# Patient Record
Sex: MALE | Race: OTHER | NOT HISPANIC OR LATINO
[De-identification: names, ages, dates, MRNs, and addresses within clinical notes are randomized per-mention and may not be internally consistent; named-entity substitution may affect disease eponyms.]

---

## 2017-08-14 ENCOUNTER — TRANSCRIPTION ENCOUNTER (OUTPATIENT)
Age: 27
End: 2017-08-14

## 2017-08-14 ENCOUNTER — APPOINTMENT (OUTPATIENT)
Dept: INTERNAL MEDICINE | Facility: CLINIC | Age: 27
End: 2017-08-14

## 2017-08-17 ENCOUNTER — TRANSCRIPTION ENCOUNTER (OUTPATIENT)
Age: 27
End: 2017-08-17

## 2017-10-01 ENCOUNTER — OUTPATIENT (OUTPATIENT)
Dept: OUTPATIENT SERVICES | Facility: HOSPITAL | Age: 27
LOS: 1 days | End: 2017-10-01
Payer: MEDICAID

## 2017-10-01 PROCEDURE — G9001: CPT

## 2017-10-05 ENCOUNTER — INPATIENT (INPATIENT)
Facility: HOSPITAL | Age: 27
LOS: 6 days | Discharge: HOME | End: 2017-10-12
Attending: PSYCHIATRY & NEUROLOGY | Admitting: PSYCHIATRY & NEUROLOGY
Payer: MEDICAID

## 2017-10-05 VITALS
TEMPERATURE: 99 F | DIASTOLIC BLOOD PRESSURE: 110 MMHG | HEART RATE: 118 BPM | RESPIRATION RATE: 16 BRPM | SYSTOLIC BLOOD PRESSURE: 156 MMHG

## 2017-10-05 RX ORDER — AZITHROMYCIN 500 MG/1
1000 TABLET, FILM COATED ORAL ONCE
Qty: 0 | Refills: 0 | Status: COMPLETED | OUTPATIENT
Start: 2017-10-05 | End: 2017-10-05

## 2017-10-05 RX ORDER — KETOROLAC TROMETHAMINE 30 MG/ML
15 SYRINGE (ML) INJECTION ONCE
Qty: 0 | Refills: 0 | Status: DISCONTINUED | OUTPATIENT
Start: 2017-10-05 | End: 2017-10-05

## 2017-10-05 RX ORDER — CEFTRIAXONE 500 MG/1
250 INJECTION, POWDER, FOR SOLUTION INTRAMUSCULAR; INTRAVENOUS ONCE
Qty: 0 | Refills: 0 | Status: COMPLETED | OUTPATIENT
Start: 2017-10-05 | End: 2017-10-05

## 2017-10-05 RX ADMIN — Medication 15 MILLIGRAM(S): at 23:45
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
## 2017-10-06 DIAGNOSIS — F31.9 BIPOLAR DISORDER, UNSPECIFIED: ICD-10-CM

## 2017-10-06 LAB
ALBUMIN SERPL ELPH-MCNC: 4.5 G/DL — SIGNIFICANT CHANGE UP (ref 3.3–5)
ALP SERPL-CCNC: 61 U/L — SIGNIFICANT CHANGE UP (ref 40–120)
ALT FLD-CCNC: 18 U/L — SIGNIFICANT CHANGE UP (ref 4–41)
AMPHET UR-MCNC: NEGATIVE — SIGNIFICANT CHANGE UP
APAP SERPL-MCNC: < 15 UG/ML — LOW (ref 15–25)
AST SERPL-CCNC: 43 U/L — HIGH (ref 4–40)
BARBITURATES MEASUREMENT: NEGATIVE — SIGNIFICANT CHANGE UP
BARBITURATES UR SCN-MCNC: NEGATIVE — SIGNIFICANT CHANGE UP
BASOPHILS # BLD AUTO: 0.04 K/UL — SIGNIFICANT CHANGE UP (ref 0–0.2)
BASOPHILS NFR BLD AUTO: 0.4 % — SIGNIFICANT CHANGE UP (ref 0–2)
BENZODIAZ SERPL-MCNC: NEGATIVE — SIGNIFICANT CHANGE UP
BENZODIAZ UR-MCNC: NEGATIVE — SIGNIFICANT CHANGE UP
BILIRUB SERPL-MCNC: 0.2 MG/DL — SIGNIFICANT CHANGE UP (ref 0.2–1.2)
BUN SERPL-MCNC: 13 MG/DL — SIGNIFICANT CHANGE UP (ref 7–23)
CALCIUM SERPL-MCNC: 9.6 MG/DL — SIGNIFICANT CHANGE UP (ref 8.4–10.5)
CANNABINOIDS UR-MCNC: POSITIVE — SIGNIFICANT CHANGE UP
CHLORIDE SERPL-SCNC: 102 MMOL/L — SIGNIFICANT CHANGE UP (ref 98–107)
CO2 SERPL-SCNC: 22 MMOL/L — SIGNIFICANT CHANGE UP (ref 22–31)
COCAINE METAB.OTHER UR-MCNC: NEGATIVE — SIGNIFICANT CHANGE UP
CREAT SERPL-MCNC: 1.03 MG/DL — SIGNIFICANT CHANGE UP (ref 0.5–1.3)
EOSINOPHIL # BLD AUTO: 0.13 K/UL — SIGNIFICANT CHANGE UP (ref 0–0.5)
EOSINOPHIL NFR BLD AUTO: 1.2 % — SIGNIFICANT CHANGE UP (ref 0–6)
ETHANOL BLD-MCNC: < 10 MG/DL — SIGNIFICANT CHANGE UP
GLUCOSE SERPL-MCNC: 76 MG/DL — SIGNIFICANT CHANGE UP (ref 70–99)
HCT VFR BLD CALC: 47.4 % — SIGNIFICANT CHANGE UP (ref 39–50)
HGB BLD-MCNC: 15.8 G/DL — SIGNIFICANT CHANGE UP (ref 13–17)
HIV1 AG SER QL: SIGNIFICANT CHANGE UP
HIV1+2 AB SPEC QL: SIGNIFICANT CHANGE UP
IMM GRANULOCYTES # BLD AUTO: 0.04 # — SIGNIFICANT CHANGE UP
IMM GRANULOCYTES NFR BLD AUTO: 0.4 % — SIGNIFICANT CHANGE UP (ref 0–1.5)
LYMPHOCYTES # BLD AUTO: 27.8 % — SIGNIFICANT CHANGE UP (ref 13–44)
LYMPHOCYTES # BLD AUTO: 3.1 K/UL — SIGNIFICANT CHANGE UP (ref 1–3.3)
MCHC RBC-ENTMCNC: 30.1 PG — SIGNIFICANT CHANGE UP (ref 27–34)
MCHC RBC-ENTMCNC: 33.3 % — SIGNIFICANT CHANGE UP (ref 32–36)
MCV RBC AUTO: 90.3 FL — SIGNIFICANT CHANGE UP (ref 80–100)
METHADONE UR-MCNC: NEGATIVE — SIGNIFICANT CHANGE UP
MONOCYTES # BLD AUTO: 1.01 K/UL — HIGH (ref 0–0.9)
MONOCYTES NFR BLD AUTO: 9 % — SIGNIFICANT CHANGE UP (ref 2–14)
NEUTROPHILS # BLD AUTO: 6.85 K/UL — SIGNIFICANT CHANGE UP (ref 1.8–7.4)
NEUTROPHILS NFR BLD AUTO: 61.2 % — SIGNIFICANT CHANGE UP (ref 43–77)
NRBC # FLD: 0 — SIGNIFICANT CHANGE UP
OPIATES UR-MCNC: NEGATIVE — SIGNIFICANT CHANGE UP
OXYCODONE UR-MCNC: NEGATIVE — SIGNIFICANT CHANGE UP
PCP UR-MCNC: NEGATIVE — SIGNIFICANT CHANGE UP
PLATELET # BLD AUTO: 232 K/UL — SIGNIFICANT CHANGE UP (ref 150–400)
PMV BLD: 9.7 FL — SIGNIFICANT CHANGE UP (ref 7–13)
POTASSIUM SERPL-MCNC: 3.8 MMOL/L — SIGNIFICANT CHANGE UP (ref 3.5–5.3)
POTASSIUM SERPL-SCNC: 3.8 MMOL/L — SIGNIFICANT CHANGE UP (ref 3.5–5.3)
PROT SERPL-MCNC: 7.1 G/DL — SIGNIFICANT CHANGE UP (ref 6–8.3)
RBC # BLD: 5.25 M/UL — SIGNIFICANT CHANGE UP (ref 4.2–5.8)
RBC # FLD: 13.5 % — SIGNIFICANT CHANGE UP (ref 10.3–14.5)
SALICYLATES SERPL-MCNC: < 5 MG/DL — LOW (ref 15–30)
SODIUM SERPL-SCNC: 141 MMOL/L — SIGNIFICANT CHANGE UP (ref 135–145)
TSH SERPL-MCNC: 2.73 UIU/ML — SIGNIFICANT CHANGE UP (ref 0.27–4.2)
WBC # BLD: 11.17 K/UL — HIGH (ref 3.8–10.5)
WBC # FLD AUTO: 11.17 K/UL — HIGH (ref 3.8–10.5)

## 2017-10-06 PROCEDURE — 99285 EMERGENCY DEPT VISIT HI MDM: CPT

## 2017-10-06 PROCEDURE — 99223 1ST HOSP IP/OBS HIGH 75: CPT

## 2017-10-06 RX ORDER — ACETAMINOPHEN 500 MG
650 TABLET ORAL EVERY 6 HOURS
Qty: 0 | Refills: 0 | Status: DISCONTINUED | OUTPATIENT
Start: 2017-10-06 | End: 2017-10-12

## 2017-10-06 RX ORDER — DIPHENHYDRAMINE HCL 50 MG
25 CAPSULE ORAL ONCE
Qty: 0 | Refills: 0 | Status: DISCONTINUED | OUTPATIENT
Start: 2017-10-06 | End: 2017-10-12

## 2017-10-06 RX ORDER — HALOPERIDOL DECANOATE 100 MG/ML
5 INJECTION INTRAMUSCULAR ONCE
Qty: 0 | Refills: 0 | Status: DISCONTINUED | OUTPATIENT
Start: 2017-10-06 | End: 2017-10-12

## 2017-10-06 RX ORDER — DIPHENHYDRAMINE HCL 50 MG
25 CAPSULE ORAL EVERY 6 HOURS
Qty: 0 | Refills: 0 | Status: DISCONTINUED | OUTPATIENT
Start: 2017-10-06 | End: 2017-10-12

## 2017-10-06 RX ORDER — ACETAMINOPHEN 500 MG
650 TABLET ORAL ONCE
Qty: 0 | Refills: 0 | Status: COMPLETED | OUTPATIENT
Start: 2017-10-06 | End: 2017-10-06

## 2017-10-06 RX ORDER — OXCARBAZEPINE 300 MG/1
600 TABLET, FILM COATED ORAL
Qty: 0 | Refills: 0 | Status: DISCONTINUED | OUTPATIENT
Start: 2017-10-06 | End: 2017-10-06

## 2017-10-06 RX ORDER — ARIPIPRAZOLE 15 MG/1
25 TABLET ORAL DAILY
Qty: 0 | Refills: 0 | Status: DISCONTINUED | OUTPATIENT
Start: 2017-10-06 | End: 2017-10-06

## 2017-10-06 RX ORDER — LIDOCAINE 4 G/100G
1 CREAM TOPICAL DAILY
Qty: 0 | Refills: 0 | Status: COMPLETED | OUTPATIENT
Start: 2017-10-06 | End: 2017-10-09

## 2017-10-06 RX ORDER — HALOPERIDOL DECANOATE 100 MG/ML
5 INJECTION INTRAMUSCULAR EVERY 6 HOURS
Qty: 0 | Refills: 0 | Status: DISCONTINUED | OUTPATIENT
Start: 2017-10-06 | End: 2017-10-12

## 2017-10-06 RX ORDER — ARIPIPRAZOLE 15 MG/1
25 TABLET ORAL DAILY
Qty: 0 | Refills: 0 | Status: DISCONTINUED | OUTPATIENT
Start: 2017-10-06 | End: 2017-10-12

## 2017-10-06 RX ORDER — ALBUTEROL 90 UG/1
2 AEROSOL, METERED ORAL EVERY 6 HOURS
Qty: 0 | Refills: 0 | Status: DISCONTINUED | OUTPATIENT
Start: 2017-10-06 | End: 2017-10-06

## 2017-10-06 RX ORDER — ALBUTEROL 90 UG/1
2 AEROSOL, METERED ORAL EVERY 6 HOURS
Qty: 0 | Refills: 0 | Status: DISCONTINUED | OUTPATIENT
Start: 2017-10-06 | End: 2017-10-12

## 2017-10-06 RX ORDER — OXCARBAZEPINE 300 MG/1
600 TABLET, FILM COATED ORAL
Qty: 0 | Refills: 0 | Status: DISCONTINUED | OUTPATIENT
Start: 2017-10-06 | End: 2017-10-12

## 2017-10-06 RX ADMIN — Medication 650 MILLIGRAM(S): at 13:35

## 2017-10-06 RX ADMIN — LIDOCAINE 1 PATCH: 4 CREAM TOPICAL at 14:42

## 2017-10-06 RX ADMIN — OXCARBAZEPINE 600 MILLIGRAM(S): 300 TABLET, FILM COATED ORAL at 08:18

## 2017-10-06 RX ADMIN — ARIPIPRAZOLE 25 MILLIGRAM(S): 15 TABLET ORAL at 14:42

## 2017-10-06 RX ADMIN — HALOPERIDOL DECANOATE 5 MILLIGRAM(S): 100 INJECTION INTRAMUSCULAR at 21:53

## 2017-10-06 RX ADMIN — Medication 2 MILLIGRAM(S): at 04:26

## 2017-10-06 RX ADMIN — Medication 2 MILLIGRAM(S): at 21:38

## 2017-10-06 RX ADMIN — Medication 25 MILLIGRAM(S): at 21:38

## 2017-10-06 RX ADMIN — Medication 650 MILLIGRAM(S): at 06:03

## 2017-10-06 RX ADMIN — AZITHROMYCIN 1000 MILLIGRAM(S): 500 TABLET, FILM COATED ORAL at 00:14

## 2017-10-06 RX ADMIN — Medication 650 MILLIGRAM(S): at 17:13

## 2017-10-06 RX ADMIN — OXCARBAZEPINE 600 MILLIGRAM(S): 300 TABLET, FILM COATED ORAL at 20:11

## 2017-10-06 RX ADMIN — CEFTRIAXONE 250 MILLIGRAM(S): 500 INJECTION, POWDER, FOR SOLUTION INTRAMUSCULAR; INTRAVENOUS at 00:14

## 2017-10-06 NOTE — ED BEHAVIORAL HEALTH ASSESSMENT NOTE - PAST PSYCHOTROPIC MEDICATION
Per chart review, as patient was disorganized and unable to give information: Lamictal - rash Latuda- more suicidal, Zyprexa- severe sedation, Seroquel - weight gain sedation Lithium- nausea/vomiting, Depakote- rash ?  Celexa- unclear why stopped, Wellbutrin-ineffective

## 2017-10-06 NOTE — ED ADULT NURSE REASSESSMENT NOTE - NS ED NURSE REASSESS COMMENT FT1
Pt remains awake, intrusive with pressured speech. Pt restless, pacing unit. Dr. Murphy made aware, pt ordered to receive STAT Ativan 2mg IM. Report given to Jeff PANIAGUA. Will continue to monitor for safety and therapeutic effect for safe tx to [facility].

## 2017-10-06 NOTE — ED BEHAVIORAL HEALTH ASSESSMENT NOTE - RISK ASSESSMENT
Chronic risk factors- young age, male gender, single, non-caretaker, history of mental illness resulting  in multiple inpatient admissions, history of medication noncompliance. Acute risk factors- acute nancy,  impaired insight/judgment, medication non-compliance, recent psychiatric hospitalization, substance abuse. Protective factors- domiciled, in supportive environment, in outpatient treatment, denies suicidal and homicidal ideations, intent and plan. Patient’s current risk factors place patient at imminent risk to self and requires inpatient hospitalization for safety.

## 2017-10-06 NOTE — ED ADULT NURSE REASSESSMENT NOTE - GENERAL PATIENT STATE
pt arrives from medical intake, awake a&ox3, restless, hyperverbal and intrusive with staff and patients.

## 2017-10-06 NOTE — ED BEHAVIORAL HEALTH ASSESSMENT NOTE - DESCRIPTION
irritable, labile. crying, tangential    Vital Signs Last 24 Hrs  T(C): 37.1 (05 Oct 2017 21:18), Max: 37.1 (05 Oct 2017 21:18)  T(F): 98.7 (05 Oct 2017 21:18), Max: 98.7 (05 Oct 2017 21:18)  HR: 82 (06 Oct 2017 02:11) (82 - 118)  BP: 156/110 (05 Oct 2017 21:18) (156/110 - 156/110)  BP(mean): --  RR: 16 (05 Oct 2017 21:18) (16 - 16)  SpO2: -- None See HPI

## 2017-10-06 NOTE — ED BEHAVIORAL HEALTH ASSESSMENT NOTE - PSYCHIATRIC ISSUES AND PLAN (INCLUDE STANDING AND PRN MEDICATION)
As pt has been non-compliant with meds resulting in repeated inpt hospitalizations, will hold Zoloft. Continue Trileptal 600 mg PO BID and Abilify 25mg for mood stabilization/nancy

## 2017-10-06 NOTE — ED BEHAVIORAL HEALTH ASSESSMENT NOTE - PRIMARY DX
Deferred diagnosis on axis II Bipolar disorder, current episode manic without psychotic features, moderate

## 2017-10-06 NOTE — ED BEHAVIORAL HEALTH ASSESSMENT NOTE - SUBSTANCE ISSUES AND PLAN (INCLUDE STANDING AND PRN MEDICATION)
Patient with history of marijuana use, reporting recent use. However, CIWA/CINA not currently indicated.

## 2017-10-06 NOTE — ED ADULT NURSE REASSESSMENT NOTE - NS ED NURSE REASSESS COMMENT FT1
+ effect from Ativan 2mg IM received. Pt resting in stretcher, NAD, VSS. Cleared for [facility] admission. Awaiting security for tx to low 3 as ordered by Katherine JANG.

## 2017-10-06 NOTE — ED BEHAVIORAL HEALTH ASSESSMENT NOTE - OTHER PAST PSYCHIATRIC HISTORY (INCLUDE DETAILS REGARDING ONSET, COURSE OF ILLNESS, INPATIENT/OUTPATIENT TREATMENT)
History of Bipolar I disorder with psychotic features, multiple inpatient admissions, in current outpatient follow up wit Dr. Lynch at [facility] and previously in [facility] hospital .    History of ECT treatments.

## 2017-10-06 NOTE — ED BEHAVIORAL HEALTH ASSESSMENT NOTE - SUICIDE RISK FACTORS
Mood episode/Highly impulsive behavior/Substance abuse/dependence/Agitation/severe anxiety/Perceived burden on family and others/Global insomnia

## 2017-10-06 NOTE — ED BEHAVIORAL HEALTH ASSESSMENT NOTE - SUMMARY
Harsh Evans is a  27 year old [place] man, single with no dependents, living with parents, former college student at [employer], reports that he is currently employed as a , , past psychiatric history of bipolar I disorder with psychotic features, history of multiple inpatient psychiatric admissions, most recently at [facility] from 4/4-4/15/16, 4/20-4/28/16, [facility] 5/2/16-5/16/16, and [facility] 5/25/16-6/3/16, previously in partial, denies history of suicide attempts, no history of violence/arrests, history of cannabis and cocaine use, no reported history of alcohol dependence/DTs/complicated withdrawals, currently in treatment with Dr Lynch, who presents with manic symptoms, lability, pacing, and tangential thoughts. This is his 4th admission in the past month for PI, nancy, delusions in the context of MJ use and likely med non-compliance.

## 2017-10-06 NOTE — ED BEHAVIORAL HEALTH ASSESSMENT NOTE - DESCRIPTION (FIRST USE, LAST USE, QUANTITY, FREQUENCY, DURATION)
states he went out today for drinks but did not finish his drink and states that he just decided to drive himself to the hospital Reports history of marijuana use, parents report use in past 2 days +Utox in the past

## 2017-10-06 NOTE — ED BEHAVIORAL HEALTH ASSESSMENT NOTE - HPI (INCLUDE ILLNESS QUALITY, SEVERITY, DURATION, TIMING, CONTEXT, MODIFYING FACTORS, ASSOCIATED SIGNS AND SYMPTOMS)
Harsh Evans is a  27 year old Mosotho man, single with no dependents, living with parents, former college student at [employer], reports that he is currently employed as a , , past psychiatric history of bipolar I disorder with psychotic features, history of multiple inpatient psychiatric admissions, most recently at [facility] from 4/4-4/15/16, 4/20-4/28/16, [facility] 5/2/16-5/16/16, and [facility] 5/25/16-6/3/16, previously in partial, denies history of suicide attempts, no history of violence/arrests, history of cannabis and cocaine use, no reported history of alcohol dependence/DTs/complicated withdrawals, currently in treatment with Dr Lynch, who presents with manic symptoms, lability, pacing, and tangential thoughts.    Patient is poor historian.  States that he presented to the hospital for leg pain.  States that he has been feeling manic since he was started on Zoloft.  Patient reports that he saw Dr Lynch today and then starts laughing.  States that he wanted to increase his Zoloft.  He then begins talking about the phone call he received from his brother in [place] who tell him that the patient is the [facility]'s [facility].  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  He reports that his brother then called his mother.  Patient reports that he has 6 diagnoses, bipolar, schizoaffective, polysubstance and continued to run off diagnoses.  When asked about polysubstance states that he stopped smoking THC and when confronter that his urine was positive asked what was the quantity and then proceeded to state that the test was wrong but states that he did smoke 2 days ago.  He reports that he also used to use "blow."  States that he was upset that Dr Lynch wanted to increase the Zoloft (last note in CVM from 9/7/17) and states that he has not been taking it and has not felt this good and then began to cry.  Patient reports that he has not slept. He continues pacing the hallway despite reported leg pain. Harsh Evans is a  27 year old Kuwaiti man, single with no dependents, living with parents, former college student at [employer], reports that he is currently employed as a , , past psychiatric history of bipolar I disorder with psychotic features, history of multiple inpatient psychiatric admissions, most recently at [facility] from 4/4-4/15/16, 4/20-4/28/16, [facility] 5/2/16-5/16/16, and [facility] 5/25/16-6/3/16, previously in partial, denies history of suicide attempts, no history of violence/arrests, history of cannabis and cocaine use, no reported history of alcohol dependence/DTs/complicated withdrawals, currently in treatment with Dr Lynch, who presents with manic symptoms, lability, pacing, acting bizarre, paranoid and with tangential thoughts.    Patient is poor historian.  States that he presented to the hospital for leg pain.  States that he has been feeling manic since he was started on Zoloft.  Patient reports that he saw Dr Lynch today and then starts laughing.  States that he wanted to increase his Zoloft.  He then begins talking about the phone call he received from his brother in [place] who tell him that the patient is the brother's [facility].  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  He reports that his brother then called his mother.  Patient reports that he has 6 diagnoses, bipolar, schizoaffective, polysubstance and continued to run off diagnoses.  When asked about polysubstance states that he stopped smoking THC and when confronter that his urine was positive asked what was the quantity and then proceeded to state that the test was wrong but states that he did smoke 2 days ago.  He reports that he also used to use "blow."  States that he was upset that Dr Lynch wanted to increase the Zoloft (last note in CVM from 9/7/17) and states that he has not been taking it and has not felt this good and then began to cry.  Patient reports that he has not slept. He continues pacing the hallway despite reported leg pain.

## 2017-10-06 NOTE — ED PROVIDER NOTE - OBJECTIVE STATEMENT
28 y/o male hx bipolar c/o chronic right leg pain worsening over past 3 weeks with some lower back pain.  States feels like nerve pain he has had in past, taking tylenol with mild relief. Also requesting psych as feels getting more manic (and per father is). no si/hi/ah. no trauma, no urinary or fecal symptoms, neuro symptoms, denies other symptoms. also requesting std testing.

## 2017-10-06 NOTE — ED PROVIDER NOTE - MEDICAL DECISION MAKING DETAILS
chronic leg pain likely sciatica based on description, no red flags, no threat to self currently but stating feeling more up and with some psychomotor agitation. will do psych screening, g/c screening and empiric treatment, psych consult/bh.

## 2017-10-07 PROCEDURE — 99232 SBSQ HOSP IP/OBS MODERATE 35: CPT

## 2017-10-07 RX ORDER — NICOTINE POLACRILEX 2 MG
1 GUM BUCCAL ONCE
Qty: 0 | Refills: 0 | Status: COMPLETED | OUTPATIENT
Start: 2017-10-07 | End: 2017-10-07

## 2017-10-07 RX ORDER — IBUPROFEN 200 MG
600 TABLET ORAL EVERY 6 HOURS
Qty: 0 | Refills: 0 | Status: DISCONTINUED | OUTPATIENT
Start: 2017-10-07 | End: 2017-10-12

## 2017-10-07 RX ORDER — NICOTINE POLACRILEX 2 MG
1 GUM BUCCAL DAILY
Qty: 0 | Refills: 0 | Status: DISCONTINUED | OUTPATIENT
Start: 2017-10-07 | End: 2017-10-12

## 2017-10-07 RX ADMIN — Medication 2 MILLIGRAM(S): at 10:00

## 2017-10-07 RX ADMIN — OXCARBAZEPINE 600 MILLIGRAM(S): 300 TABLET, FILM COATED ORAL at 07:49

## 2017-10-07 RX ADMIN — Medication 30 MILLILITER(S): at 13:08

## 2017-10-07 RX ADMIN — Medication 600 MILLIGRAM(S): at 17:20

## 2017-10-07 RX ADMIN — ARIPIPRAZOLE 25 MILLIGRAM(S): 15 TABLET ORAL at 09:20

## 2017-10-07 RX ADMIN — LIDOCAINE 1 PATCH: 4 CREAM TOPICAL at 20:36

## 2017-10-07 RX ADMIN — ALBUTEROL 2 PUFF(S): 90 AEROSOL, METERED ORAL at 07:56

## 2017-10-07 RX ADMIN — Medication 600 MILLIGRAM(S): at 09:59

## 2017-10-07 RX ADMIN — OXCARBAZEPINE 600 MILLIGRAM(S): 300 TABLET, FILM COATED ORAL at 20:36

## 2017-10-07 RX ADMIN — LIDOCAINE 1 PATCH: 4 CREAM TOPICAL at 11:21

## 2017-10-07 RX ADMIN — Medication 1 EACH: at 18:00

## 2017-10-07 RX ADMIN — Medication 600 MILLIGRAM(S): at 16:40

## 2017-10-07 RX ADMIN — Medication 600 MILLIGRAM(S): at 18:08

## 2017-10-07 RX ADMIN — Medication 25 MILLIGRAM(S): at 14:10

## 2017-10-07 RX ADMIN — Medication 2 MILLIGRAM(S): at 20:36

## 2017-10-08 PROCEDURE — 99231 SBSQ HOSP IP/OBS SF/LOW 25: CPT

## 2017-10-08 RX ORDER — DOCUSATE SODIUM 100 MG
100 CAPSULE ORAL ONCE
Qty: 0 | Refills: 0 | Status: COMPLETED | OUTPATIENT
Start: 2017-10-08 | End: 2017-10-08

## 2017-10-08 RX ORDER — NICOTINE POLACRILEX 2 MG
1 GUM BUCCAL DAILY
Qty: 0 | Refills: 0 | Status: DISCONTINUED | OUTPATIENT
Start: 2017-10-08 | End: 2017-10-12

## 2017-10-08 RX ADMIN — Medication 600 MILLIGRAM(S): at 11:00

## 2017-10-08 RX ADMIN — Medication 600 MILLIGRAM(S): at 20:35

## 2017-10-08 RX ADMIN — OXCARBAZEPINE 600 MILLIGRAM(S): 300 TABLET, FILM COATED ORAL at 20:34

## 2017-10-08 RX ADMIN — Medication 100 MILLIGRAM(S): at 09:57

## 2017-10-08 RX ADMIN — OXCARBAZEPINE 600 MILLIGRAM(S): 300 TABLET, FILM COATED ORAL at 07:53

## 2017-10-08 RX ADMIN — Medication 2 MILLIGRAM(S): at 21:05

## 2017-10-08 RX ADMIN — Medication 1 PATCH: at 07:51

## 2017-10-08 RX ADMIN — LIDOCAINE 1 PATCH: 4 CREAM TOPICAL at 10:15

## 2017-10-08 RX ADMIN — ARIPIPRAZOLE 25 MILLIGRAM(S): 15 TABLET ORAL at 09:17

## 2017-10-08 RX ADMIN — ALBUTEROL 2 PUFF(S): 90 AEROSOL, METERED ORAL at 20:37

## 2017-10-08 RX ADMIN — Medication 600 MILLIGRAM(S): at 21:35

## 2017-10-08 RX ADMIN — Medication 600 MILLIGRAM(S): at 11:26

## 2017-10-09 PROCEDURE — 99232 SBSQ HOSP IP/OBS MODERATE 35: CPT

## 2017-10-09 RX ORDER — POLYETHYLENE GLYCOL 3350 17 G/17G
17 POWDER, FOR SOLUTION ORAL DAILY
Qty: 0 | Refills: 0 | Status: DISCONTINUED | OUTPATIENT
Start: 2017-10-09 | End: 2017-10-12

## 2017-10-09 RX ORDER — ARIPIPRAZOLE 15 MG/1
300 TABLET ORAL ONCE
Qty: 0 | Refills: 0 | Status: COMPLETED | OUTPATIENT
Start: 2017-10-09 | End: 2017-10-09

## 2017-10-09 RX ORDER — INFLUENZA VIRUS VACCINE 15; 15; 15; 15 UG/.5ML; UG/.5ML; UG/.5ML; UG/.5ML
0.5 SUSPENSION INTRAMUSCULAR ONCE
Qty: 0 | Refills: 0 | Status: DISCONTINUED | OUTPATIENT
Start: 2017-10-09 | End: 2017-10-09

## 2017-10-09 RX ADMIN — OXCARBAZEPINE 600 MILLIGRAM(S): 300 TABLET, FILM COATED ORAL at 08:53

## 2017-10-09 RX ADMIN — HALOPERIDOL DECANOATE 5 MILLIGRAM(S): 100 INJECTION INTRAMUSCULAR at 05:57

## 2017-10-09 RX ADMIN — Medication 600 MILLIGRAM(S): at 14:50

## 2017-10-09 RX ADMIN — ALBUTEROL 2 PUFF(S): 90 AEROSOL, METERED ORAL at 20:00

## 2017-10-09 RX ADMIN — Medication 600 MILLIGRAM(S): at 06:30

## 2017-10-09 RX ADMIN — Medication 1 PATCH: at 06:38

## 2017-10-09 RX ADMIN — ARIPIPRAZOLE 300 MILLIGRAM(S): 15 TABLET ORAL at 14:42

## 2017-10-09 RX ADMIN — Medication 650 MILLIGRAM(S): at 18:05

## 2017-10-09 RX ADMIN — Medication 1 EACH: at 21:00

## 2017-10-09 RX ADMIN — Medication 2 MILLIGRAM(S): at 20:12

## 2017-10-09 RX ADMIN — Medication 650 MILLIGRAM(S): at 09:02

## 2017-10-09 RX ADMIN — POLYETHYLENE GLYCOL 3350 17 GRAM(S): 17 POWDER, FOR SOLUTION ORAL at 18:05

## 2017-10-09 RX ADMIN — LIDOCAINE 1 PATCH: 4 CREAM TOPICAL at 05:56

## 2017-10-09 RX ADMIN — OXCARBAZEPINE 600 MILLIGRAM(S): 300 TABLET, FILM COATED ORAL at 20:12

## 2017-10-09 RX ADMIN — ARIPIPRAZOLE 25 MILLIGRAM(S): 15 TABLET ORAL at 08:53

## 2017-10-09 RX ADMIN — Medication 600 MILLIGRAM(S): at 05:55

## 2017-10-09 RX ADMIN — Medication 25 MILLIGRAM(S): at 05:57

## 2017-10-10 PROCEDURE — 90853 GROUP PSYCHOTHERAPY: CPT

## 2017-10-10 RX ORDER — LIDOCAINE 4 G/100G
1 CREAM TOPICAL DAILY
Qty: 0 | Refills: 0 | Status: DISCONTINUED | OUTPATIENT
Start: 2017-10-10 | End: 2017-10-12

## 2017-10-10 RX ADMIN — Medication 25 MILLIGRAM(S): at 21:00

## 2017-10-10 RX ADMIN — LIDOCAINE 1 PATCH: 4 CREAM TOPICAL at 10:45

## 2017-10-10 RX ADMIN — Medication 650 MILLIGRAM(S): at 07:35

## 2017-10-10 RX ADMIN — Medication 650 MILLIGRAM(S): at 20:23

## 2017-10-10 RX ADMIN — Medication 600 MILLIGRAM(S): at 18:15

## 2017-10-10 RX ADMIN — Medication 30 MILLILITER(S): at 20:42

## 2017-10-10 RX ADMIN — Medication 2 MILLIGRAM(S): at 14:38

## 2017-10-10 RX ADMIN — Medication 25 MILLIGRAM(S): at 15:00

## 2017-10-10 RX ADMIN — Medication 650 MILLIGRAM(S): at 21:23

## 2017-10-10 RX ADMIN — HALOPERIDOL DECANOATE 5 MILLIGRAM(S): 100 INJECTION INTRAMUSCULAR at 15:00

## 2017-10-10 RX ADMIN — Medication 600 MILLIGRAM(S): at 17:15

## 2017-10-10 RX ADMIN — OXCARBAZEPINE 600 MILLIGRAM(S): 300 TABLET, FILM COATED ORAL at 08:30

## 2017-10-10 RX ADMIN — Medication 650 MILLIGRAM(S): at 08:30

## 2017-10-10 RX ADMIN — Medication 1 PATCH: at 09:33

## 2017-10-10 RX ADMIN — LIDOCAINE 1 PATCH: 4 CREAM TOPICAL at 09:34

## 2017-10-10 RX ADMIN — ARIPIPRAZOLE 25 MILLIGRAM(S): 15 TABLET ORAL at 08:30

## 2017-10-10 RX ADMIN — OXCARBAZEPINE 600 MILLIGRAM(S): 300 TABLET, FILM COATED ORAL at 20:23

## 2017-10-11 LAB
HIV 1+2 AB+HIV1 P24 AG SERPL QL IA: SIGNIFICANT CHANGE UP
OXCARBAZEPINE SERPL-MCNC: 19 UG/ML — SIGNIFICANT CHANGE UP (ref 10–35)

## 2017-10-11 RX ORDER — PNEUMOCOCCAL 23-VAL P-SAC VAC 25MCG/0.5
0.5 VIAL (ML) INJECTION ONCE
Qty: 0 | Refills: 0 | Status: COMPLETED | OUTPATIENT
Start: 2017-10-11 | End: 2017-10-11

## 2017-10-11 RX ORDER — INFLUENZA VIRUS VACCINE 15; 15; 15; 15 UG/.5ML; UG/.5ML; UG/.5ML; UG/.5ML
0.5 SUSPENSION INTRAMUSCULAR ONCE
Qty: 0 | Refills: 0 | Status: COMPLETED | OUTPATIENT
Start: 2017-10-11 | End: 2017-10-11

## 2017-10-11 RX ADMIN — OXCARBAZEPINE 600 MILLIGRAM(S): 300 TABLET, FILM COATED ORAL at 21:17

## 2017-10-11 RX ADMIN — Medication 650 MILLIGRAM(S): at 08:21

## 2017-10-11 RX ADMIN — Medication 2 MILLIGRAM(S): at 11:42

## 2017-10-11 RX ADMIN — Medication 600 MILLIGRAM(S): at 11:42

## 2017-10-11 RX ADMIN — Medication 600 MILLIGRAM(S): at 04:42

## 2017-10-11 RX ADMIN — LIDOCAINE 1 PATCH: 4 CREAM TOPICAL at 16:25

## 2017-10-11 RX ADMIN — Medication 0.5 MILLILITER(S): at 06:15

## 2017-10-11 RX ADMIN — Medication 650 MILLIGRAM(S): at 16:25

## 2017-10-11 RX ADMIN — LIDOCAINE 1 PATCH: 4 CREAM TOPICAL at 08:21

## 2017-10-11 RX ADMIN — Medication 600 MILLIGRAM(S): at 17:48

## 2017-10-11 RX ADMIN — Medication 600 MILLIGRAM(S): at 16:25

## 2017-10-11 RX ADMIN — Medication 25 MILLIGRAM(S): at 16:40

## 2017-10-11 RX ADMIN — INFLUENZA VIRUS VACCINE 0.5 MILLILITER(S): 15; 15; 15; 15 SUSPENSION INTRAMUSCULAR at 16:39

## 2017-10-11 RX ADMIN — OXCARBAZEPINE 600 MILLIGRAM(S): 300 TABLET, FILM COATED ORAL at 08:20

## 2017-10-11 RX ADMIN — Medication 30 MILLILITER(S): at 05:45

## 2017-10-11 RX ADMIN — Medication 1 EACH: at 06:00

## 2017-10-11 RX ADMIN — ARIPIPRAZOLE 25 MILLIGRAM(S): 15 TABLET ORAL at 08:20

## 2017-10-11 RX ADMIN — Medication 600 MILLIGRAM(S): at 05:42

## 2017-10-12 VITALS — WEIGHT: 205.03 LBS | HEIGHT: 69 IN

## 2017-10-12 DIAGNOSIS — R69 ILLNESS, UNSPECIFIED: ICD-10-CM

## 2017-10-12 PROCEDURE — 99238 HOSP IP/OBS DSCHRG MGMT 30/<: CPT

## 2017-10-12 RX ORDER — OXCARBAZEPINE 300 MG/1
1 TABLET, FILM COATED ORAL
Qty: 60 | Refills: 0 | OUTPATIENT
Start: 2017-10-12 | End: 2017-11-11

## 2017-10-12 RX ORDER — LIDOCAINE 4 G/100G
1 CREAM TOPICAL
Qty: 4 | Refills: 0 | OUTPATIENT
Start: 2017-10-12 | End: 2017-10-16

## 2017-10-12 RX ORDER — ARIPIPRAZOLE 15 MG/1
20 TABLET ORAL
Qty: 600 | Refills: 0 | OUTPATIENT
Start: 2017-10-12 | End: 2017-11-11

## 2017-10-12 RX ADMIN — ARIPIPRAZOLE 25 MILLIGRAM(S): 15 TABLET ORAL at 10:11

## 2017-10-12 RX ADMIN — OXCARBAZEPINE 600 MILLIGRAM(S): 300 TABLET, FILM COATED ORAL at 10:11

## 2017-10-15 ENCOUNTER — EMERGENCY (EMERGENCY)
Facility: HOSPITAL | Age: 27
LOS: 1 days | Discharge: HOME | End: 2017-10-15
Attending: EMERGENCY MEDICINE | Admitting: EMERGENCY MEDICINE
Payer: MEDICAID

## 2017-10-15 VITALS
SYSTOLIC BLOOD PRESSURE: 135 MMHG | OXYGEN SATURATION: 98 % | RESPIRATION RATE: 17 BRPM | DIASTOLIC BLOOD PRESSURE: 79 MMHG | HEART RATE: 98 BPM | TEMPERATURE: 98 F

## 2017-10-15 VITALS
DIASTOLIC BLOOD PRESSURE: 70 MMHG | OXYGEN SATURATION: 100 % | RESPIRATION RATE: 16 BRPM | TEMPERATURE: 98 F | SYSTOLIC BLOOD PRESSURE: 150 MMHG | HEART RATE: 61 BPM

## 2017-10-15 DIAGNOSIS — F31.0 BIPOLAR DISORDER, CURRENT EPISODE HYPOMANIC: ICD-10-CM

## 2017-10-15 LAB
ALBUMIN SERPL ELPH-MCNC: 4.4 G/DL — SIGNIFICANT CHANGE UP (ref 3.3–5)
ALP SERPL-CCNC: 63 U/L — SIGNIFICANT CHANGE UP (ref 40–120)
ALT FLD-CCNC: 31 U/L — SIGNIFICANT CHANGE UP (ref 4–41)
AMPHET UR-MCNC: NEGATIVE — SIGNIFICANT CHANGE UP
APAP SERPL-MCNC: < 15 UG/ML — LOW (ref 15–25)
APPEARANCE UR: CLEAR — SIGNIFICANT CHANGE UP
AST SERPL-CCNC: 45 U/L — HIGH (ref 4–40)
BARBITURATES MEASUREMENT: NEGATIVE — SIGNIFICANT CHANGE UP
BARBITURATES UR SCN-MCNC: NEGATIVE — SIGNIFICANT CHANGE UP
BASOPHILS # BLD AUTO: 0.03 K/UL — SIGNIFICANT CHANGE UP (ref 0–0.2)
BASOPHILS NFR BLD AUTO: 0.3 % — SIGNIFICANT CHANGE UP (ref 0–2)
BENZODIAZ SERPL-MCNC: NEGATIVE — SIGNIFICANT CHANGE UP
BENZODIAZ UR-MCNC: NEGATIVE — SIGNIFICANT CHANGE UP
BILIRUB SERPL-MCNC: 0.3 MG/DL — SIGNIFICANT CHANGE UP (ref 0.2–1.2)
BILIRUB UR-MCNC: NEGATIVE — SIGNIFICANT CHANGE UP
BLOOD UR QL VISUAL: NEGATIVE — SIGNIFICANT CHANGE UP
BUN SERPL-MCNC: 14 MG/DL — SIGNIFICANT CHANGE UP (ref 7–23)
CALCIUM SERPL-MCNC: 9.7 MG/DL — SIGNIFICANT CHANGE UP (ref 8.4–10.5)
CANNABINOIDS UR-MCNC: POSITIVE — SIGNIFICANT CHANGE UP
CHLORIDE SERPL-SCNC: 97 MMOL/L — LOW (ref 98–107)
CO2 SERPL-SCNC: 22 MMOL/L — SIGNIFICANT CHANGE UP (ref 22–31)
COCAINE METAB.OTHER UR-MCNC: NEGATIVE — SIGNIFICANT CHANGE UP
COLOR SPEC: YELLOW — SIGNIFICANT CHANGE UP
CREAT SERPL-MCNC: 0.98 MG/DL — SIGNIFICANT CHANGE UP (ref 0.5–1.3)
EOSINOPHIL # BLD AUTO: 0.19 K/UL — SIGNIFICANT CHANGE UP (ref 0–0.5)
EOSINOPHIL NFR BLD AUTO: 2.1 % — SIGNIFICANT CHANGE UP (ref 0–6)
ETHANOL BLD-MCNC: < 10 MG/DL — SIGNIFICANT CHANGE UP
GLUCOSE SERPL-MCNC: 96 MG/DL — SIGNIFICANT CHANGE UP (ref 70–99)
GLUCOSE UR-MCNC: NEGATIVE — SIGNIFICANT CHANGE UP
HCT VFR BLD CALC: 46.8 % — SIGNIFICANT CHANGE UP (ref 39–50)
HGB BLD-MCNC: 15.6 G/DL — SIGNIFICANT CHANGE UP (ref 13–17)
IMM GRANULOCYTES # BLD AUTO: 0.06 # — SIGNIFICANT CHANGE UP
IMM GRANULOCYTES NFR BLD AUTO: 0.7 % — SIGNIFICANT CHANGE UP (ref 0–1.5)
KETONES UR-MCNC: NEGATIVE — SIGNIFICANT CHANGE UP
LEUKOCYTE ESTERASE UR-ACNC: NEGATIVE — SIGNIFICANT CHANGE UP
LYMPHOCYTES # BLD AUTO: 3.1 K/UL — SIGNIFICANT CHANGE UP (ref 1–3.3)
LYMPHOCYTES # BLD AUTO: 33.8 % — SIGNIFICANT CHANGE UP (ref 13–44)
MCHC RBC-ENTMCNC: 30.6 PG — SIGNIFICANT CHANGE UP (ref 27–34)
MCHC RBC-ENTMCNC: 33.3 % — SIGNIFICANT CHANGE UP (ref 32–36)
MCV RBC AUTO: 91.8 FL — SIGNIFICANT CHANGE UP (ref 80–100)
METHADONE UR-MCNC: NEGATIVE — SIGNIFICANT CHANGE UP
MONOCYTES # BLD AUTO: 0.99 K/UL — HIGH (ref 0–0.9)
MONOCYTES NFR BLD AUTO: 10.8 % — SIGNIFICANT CHANGE UP (ref 2–14)
MUCOUS THREADS # UR AUTO: SIGNIFICANT CHANGE UP
NEUTROPHILS # BLD AUTO: 4.8 K/UL — SIGNIFICANT CHANGE UP (ref 1.8–7.4)
NEUTROPHILS NFR BLD AUTO: 52.3 % — SIGNIFICANT CHANGE UP (ref 43–77)
NITRITE UR-MCNC: NEGATIVE — SIGNIFICANT CHANGE UP
NRBC # FLD: 0 — SIGNIFICANT CHANGE UP
OPIATES UR-MCNC: NEGATIVE — SIGNIFICANT CHANGE UP
OXYCODONE UR-MCNC: NEGATIVE — SIGNIFICANT CHANGE UP
PCP UR-MCNC: NEGATIVE — SIGNIFICANT CHANGE UP
PH UR: 6.5 — SIGNIFICANT CHANGE UP (ref 4.6–8)
PLATELET # BLD AUTO: 263 K/UL — SIGNIFICANT CHANGE UP (ref 150–400)
PMV BLD: 9.4 FL — SIGNIFICANT CHANGE UP (ref 7–13)
POTASSIUM SERPL-MCNC: 3.9 MMOL/L — SIGNIFICANT CHANGE UP (ref 3.5–5.3)
POTASSIUM SERPL-SCNC: 3.9 MMOL/L — SIGNIFICANT CHANGE UP (ref 3.5–5.3)
PROT SERPL-MCNC: 7.8 G/DL — SIGNIFICANT CHANGE UP (ref 6–8.3)
PROT UR-MCNC: 20 — SIGNIFICANT CHANGE UP
RBC # BLD: 5.1 M/UL — SIGNIFICANT CHANGE UP (ref 4.2–5.8)
RBC # FLD: 12.7 % — SIGNIFICANT CHANGE UP (ref 10.3–14.5)
RBC CASTS # UR COMP ASSIST: SIGNIFICANT CHANGE UP (ref 0–?)
SALICYLATES SERPL-MCNC: < 5 MG/DL — LOW (ref 15–30)
SODIUM SERPL-SCNC: 134 MMOL/L — LOW (ref 135–145)
SP GR SPEC: 1.02 — SIGNIFICANT CHANGE UP (ref 1–1.03)
TSH SERPL-MCNC: 2.28 UIU/ML — SIGNIFICANT CHANGE UP (ref 0.27–4.2)
UROBILINOGEN FLD QL: NORMAL E.U. — SIGNIFICANT CHANGE UP (ref 0.1–0.2)
WBC # BLD: 9.17 K/UL — SIGNIFICANT CHANGE UP (ref 3.8–10.5)
WBC # FLD AUTO: 9.17 K/UL — SIGNIFICANT CHANGE UP (ref 3.8–10.5)
WBC UR QL: SIGNIFICANT CHANGE UP (ref 0–?)

## 2017-10-15 PROCEDURE — 93010 ELECTROCARDIOGRAM REPORT: CPT

## 2017-10-15 PROCEDURE — 99285 EMERGENCY DEPT VISIT HI MDM: CPT | Mod: 25

## 2017-10-15 PROCEDURE — 90792 PSYCH DIAG EVAL W/MED SRVCS: CPT

## 2017-10-15 RX ORDER — BUDESONIDE AND FORMOTEROL FUMARATE DIHYDRATE 160; 4.5 UG/1; UG/1
2 AEROSOL RESPIRATORY (INHALATION) ONCE
Qty: 0 | Refills: 0 | Status: COMPLETED | OUTPATIENT
Start: 2017-10-15 | End: 2017-10-15

## 2017-10-15 RX ORDER — HALOPERIDOL DECANOATE 100 MG/ML
5 INJECTION INTRAMUSCULAR ONCE
Qty: 0 | Refills: 0 | Status: COMPLETED | OUTPATIENT
Start: 2017-10-15 | End: 2017-10-15

## 2017-10-15 RX ADMIN — HALOPERIDOL DECANOATE 5 MILLIGRAM(S): 100 INJECTION INTRAMUSCULAR at 09:51

## 2017-10-15 RX ADMIN — Medication 2 MILLIGRAM(S): at 09:51

## 2017-10-15 RX ADMIN — BUDESONIDE AND FORMOTEROL FUMARATE DIHYDRATE 2 PUFF(S): 160; 4.5 AEROSOL RESPIRATORY (INHALATION) at 10:21

## 2017-10-15 NOTE — ED BEHAVIORAL HEALTH ASSESSMENT NOTE - OTHER
substance use and leg pain fair somewhat increased rate, however not pressured at times irritable when discussing mom not believing he has pain substance
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
## 2017-10-15 NOTE — ED BEHAVIORAL HEALTH ASSESSMENT NOTE - SUICIDE PROTECTIVE FACTORS
Fear of death or dying due to pain/suffering/Positive therapeutic relationships/Supportive social network or family/Identifies reasons for living/Future oriented/Responsibility to family and others

## 2017-10-15 NOTE — ED ADULT NURSE NOTE - CHIEF COMPLAINT QUOTE
Pt walk in c/o Low back Pain with Numbness on Right leg Dx with Sciatica. Admitted Here at  last week. Pt looks uncomfortable. Per mother Pt looks like having Manic episode

## 2017-10-15 NOTE — ED BEHAVIORAL HEALTH ASSESSMENT NOTE - OTHER PAST PSYCHIATRIC HISTORY (INCLUDE DETAILS REGARDING ONSET, COURSE OF ILLNESS, INPATIENT/OUTPATIENT TREATMENT)
History of Bipolar I disorder with psychotic features, multiple inpt admissions, in [facility] .    History of ECT treatments with good effect

## 2017-10-15 NOTE — ED BEHAVIORAL HEALTH ASSESSMENT NOTE - DESCRIPTION (FIRST USE, LAST USE, QUANTITY, FREQUENCY, DURATION)
See HPI Reports history of marijuana use, pt reports he used once yesterday; UTOX positive for cannabis in the past; Utox negative for cocaine
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
## 2017-10-15 NOTE — ED PROVIDER NOTE - ATTENDING CONTRIBUTION TO CARE
Dr. Stapleton: I have personally performed a face to face bedside history and physical examination of this patient. I have discussed the history, examination, review of systems, assessment and plan of management with the resident. I have reviewed the electronic medical record and amended it to reflect my history, review of systems, physical exam, assessment and plan.    Attending Exam - Dr. Stapleton: GEN: well appearing, NAD  HEENT: +PERRL, EOMI  RESP: CTAB, no signs of resipiratory distress CV: s1s2 RRR ABD: soft/non tender/non distended  MSK: no deformities / swelling, normal range of motion, spine grossly normal NEURO: alert, non focal exam SKIN: normal color / temperature / condition. Dr. Stapleton: I have personally performed a face to face bedside history and physical examination of this patient. I have discussed the history, examination, review of systems, assessment and plan of management with the resident. I have reviewed the electronic medical record and amended it to reflect my history, review of systems, physical exam, assessment and plan.    Attending Exam - Dr. Stapleton: GEN: well appearing, NAD  HEENT: +PERRL, EOMI  RESP: CTAB, no signs of resipiratory distress CV: s1s2 RRR ABD: soft/non tender/non distended  MSK: no deformities / swelling, normal range of motion, spine grossly normal NEURO: alert, non focal exam SKIN: normal color / temperature / condition.    I endorsed this patient to the  NP after the patient was medically cleared by me, the NP placed the disposition of the patient.

## 2017-10-15 NOTE — ED BEHAVIORAL HEALTH ASSESSMENT NOTE - SAFETY PLAN DETAILS
Pt will notify his parents, call his provider, 911 or go to the nearest ER if SI/HI arise or if in crisis.

## 2017-10-15 NOTE — ED BEHAVIORAL HEALTH ASSESSMENT NOTE - DETAILS
See above [facility] 10/6/17-10/12/17 leg pain Mom was notified of d/c from hospital, she came to pick him up. Records reviewed on CVM

## 2017-10-15 NOTE — ED BEHAVIORAL HEALTH ASSESSMENT NOTE - SUICIDE RISK FACTORS
Substance abuse/dependence/Perceived burden on family and others/Mood episode/Agitation/severe anxiety

## 2017-10-15 NOTE — ED ADULT NURSE NOTE - OBJECTIVE STATEMENT
Rec'd in room 23. CC lower back pain radiating to right leg Rec'd in room 23. CC lower back pain radiating to right leg. Pt is manic. PMH Bipolar 1. Pt restless and agitated. Unable to get additional information from pt. Pt requested for mother to leave. Aox2. No acute distress.

## 2017-10-15 NOTE — ED ADULT TRIAGE NOTE - CHIEF COMPLAINT QUOTE
Pt walk in c/o Low back Pain with Numbness on Right leg Dx with Sciatica. Admitted Here at  last week. Pt walk in c/o Low back Pain with Numbness on Right leg Dx with Sciatica. Admitted Here at  last week. Pt looks uncomfortable. Per mother Pt looks like having Manic episode

## 2017-10-15 NOTE — ED BEHAVIORAL HEALTH ASSESSMENT NOTE - RISK ASSESSMENT
Chronic risk factors- young age, male gender, single, non-caretaker, history of mental illness resulting  in multiple inpatient admissions, history of medication noncompliance. Acute risk factors- hypomania,  limited insight/judgment, h/o medication non-compliance, recent psychiatric hospitalization, substance abuse. Protective factors- domiciled, in supportive environment, in outpatient treatment, denies SI/HI, intent and plan.

## 2017-10-15 NOTE — ED PROVIDER NOTE - MEDICAL DECISION MAKING DETAILS
26 yo man w/ increasing disorganization and nancy. Also w/ reports of chronic shoulder pain. Exam normal. Cleared for psychiatric eval. Will move to  for patient/staff safety. 26 yo man w/ increasing disorganization and nancy. Also w/ reports of chronic shoulder pain. Exam normal. Patient also requesting STI testing, will send HIV, syphilis screen, GC/chlamydia screen.  Cleared for psychiatric eval.  Will consult psych and dispo per psych
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
## 2017-10-15 NOTE — ED BEHAVIORAL HEALTH ASSESSMENT NOTE - DESCRIPTION
Initially agitated/disorganized, required voluntary Haldol and Ativan with good effect. During the interview he was calm and cooperative. None See HPI

## 2017-10-16 LAB
C TRACH RRNA SPEC QL NAA+PROBE: SIGNIFICANT CHANGE UP
N GONORRHOEA RRNA SPEC QL NAA+PROBE: SIGNIFICANT CHANGE UP
SPECIMEN SOURCE: SIGNIFICANT CHANGE UP
SPECIMEN SOURCE: SIGNIFICANT CHANGE UP
T PALLIDUM AB TITR SER: NEGATIVE — SIGNIFICANT CHANGE UP

## 2017-10-17 ENCOUNTER — INPATIENT (INPATIENT)
Facility: HOSPITAL | Age: 27
LOS: 14 days | Discharge: HOME | End: 2017-11-01
Attending: PSYCHIATRY & NEUROLOGY | Admitting: PSYCHIATRY & NEUROLOGY
Payer: MEDICAID

## 2017-10-17 VITALS
SYSTOLIC BLOOD PRESSURE: 146 MMHG | OXYGEN SATURATION: 99 % | HEART RATE: 65 BPM | TEMPERATURE: 98 F | RESPIRATION RATE: 16 BRPM | DIASTOLIC BLOOD PRESSURE: 85 MMHG

## 2017-10-17 DIAGNOSIS — F31.2 BIPOLAR DISORDER, CURRENT EPISODE MANIC SEVERE WITH PSYCHOTIC FEATURES: ICD-10-CM

## 2017-10-17 LAB
ALBUMIN SERPL ELPH-MCNC: 4.4 G/DL — SIGNIFICANT CHANGE UP (ref 3.3–5)
ALP SERPL-CCNC: 62 U/L — SIGNIFICANT CHANGE UP (ref 40–120)
ALT FLD-CCNC: 29 U/L — SIGNIFICANT CHANGE UP (ref 4–41)
AMPHET UR-MCNC: NEGATIVE — SIGNIFICANT CHANGE UP
APAP SERPL-MCNC: < 15 UG/ML — LOW (ref 15–25)
APPEARANCE UR: CLEAR — SIGNIFICANT CHANGE UP
AST SERPL-CCNC: 55 U/L — HIGH (ref 4–40)
BACTERIA # UR AUTO: SIGNIFICANT CHANGE UP
BACTERIA UR CULT: SIGNIFICANT CHANGE UP
BARBITURATES MEASUREMENT: NEGATIVE — SIGNIFICANT CHANGE UP
BARBITURATES UR SCN-MCNC: NEGATIVE — SIGNIFICANT CHANGE UP
BASOPHILS # BLD AUTO: 0.06 K/UL — SIGNIFICANT CHANGE UP (ref 0–0.2)
BASOPHILS NFR BLD AUTO: 0.5 % — SIGNIFICANT CHANGE UP (ref 0–2)
BASOPHILS NFR SPEC: 0 % — SIGNIFICANT CHANGE UP (ref 0–2)
BENZODIAZ SERPL-MCNC: NEGATIVE — SIGNIFICANT CHANGE UP
BENZODIAZ UR-MCNC: NEGATIVE — SIGNIFICANT CHANGE UP
BILIRUB SERPL-MCNC: 0.4 MG/DL — SIGNIFICANT CHANGE UP (ref 0.2–1.2)
BILIRUB UR-MCNC: NEGATIVE — SIGNIFICANT CHANGE UP
BLOOD UR QL VISUAL: NEGATIVE — SIGNIFICANT CHANGE UP
BUN SERPL-MCNC: 20 MG/DL — SIGNIFICANT CHANGE UP (ref 7–23)
CALCIUM SERPL-MCNC: 9.8 MG/DL — SIGNIFICANT CHANGE UP (ref 8.4–10.5)
CANNABINOIDS UR-MCNC: POSITIVE — SIGNIFICANT CHANGE UP
CHLORIDE SERPL-SCNC: 105 MMOL/L — SIGNIFICANT CHANGE UP (ref 98–107)
CO2 SERPL-SCNC: 22 MMOL/L — SIGNIFICANT CHANGE UP (ref 22–31)
COCAINE METAB.OTHER UR-MCNC: NEGATIVE — SIGNIFICANT CHANGE UP
COLOR SPEC: YELLOW — SIGNIFICANT CHANGE UP
CREAT SERPL-MCNC: 1.12 MG/DL — SIGNIFICANT CHANGE UP (ref 0.5–1.3)
EOSINOPHIL # BLD AUTO: 0.11 K/UL — SIGNIFICANT CHANGE UP (ref 0–0.5)
EOSINOPHIL NFR BLD AUTO: 0.9 % — SIGNIFICANT CHANGE UP (ref 0–6)
EOSINOPHIL NFR FLD: 0 % — SIGNIFICANT CHANGE UP (ref 0–6)
ETHANOL BLD-MCNC: < 10 MG/DL — SIGNIFICANT CHANGE UP
GLUCOSE SERPL-MCNC: 98 MG/DL — SIGNIFICANT CHANGE UP (ref 70–99)
GLUCOSE UR-MCNC: NEGATIVE — SIGNIFICANT CHANGE UP
HCT VFR BLD CALC: 48.9 % — SIGNIFICANT CHANGE UP (ref 39–50)
HGB BLD-MCNC: 16.1 G/DL — SIGNIFICANT CHANGE UP (ref 13–17)
HIV1 AG SER QL: SIGNIFICANT CHANGE UP
HIV1+2 AB SPEC QL: SIGNIFICANT CHANGE UP
IMM GRANULOCYTES # BLD AUTO: 0.05 # — SIGNIFICANT CHANGE UP
IMM GRANULOCYTES NFR BLD AUTO: 0.4 % — SIGNIFICANT CHANGE UP (ref 0–1.5)
KETONES UR-MCNC: SIGNIFICANT CHANGE UP
LEUKOCYTE ESTERASE UR-ACNC: NEGATIVE — SIGNIFICANT CHANGE UP
LYMPHOCYTES # BLD AUTO: 38.9 % — SIGNIFICANT CHANGE UP (ref 13–44)
LYMPHOCYTES # BLD AUTO: 4.89 K/UL — HIGH (ref 1–3.3)
LYMPHOCYTES NFR SPEC AUTO: 23.7 % — SIGNIFICANT CHANGE UP (ref 13–44)
MACROCYTES BLD QL: SLIGHT — SIGNIFICANT CHANGE UP
MCHC RBC-ENTMCNC: 30.3 PG — SIGNIFICANT CHANGE UP (ref 27–34)
MCHC RBC-ENTMCNC: 32.9 % — SIGNIFICANT CHANGE UP (ref 32–36)
MCV RBC AUTO: 92.1 FL — SIGNIFICANT CHANGE UP (ref 80–100)
METHADONE UR-MCNC: NEGATIVE — SIGNIFICANT CHANGE UP
MONOCYTES # BLD AUTO: 0.81 K/UL — SIGNIFICANT CHANGE UP (ref 0–0.9)
MONOCYTES NFR BLD AUTO: 6.4 % — SIGNIFICANT CHANGE UP (ref 2–14)
MONOCYTES NFR BLD: 6.1 % — SIGNIFICANT CHANGE UP (ref 2–9)
MUCOUS THREADS # UR AUTO: SIGNIFICANT CHANGE UP
NEUTROPHIL AB SER-ACNC: 62.3 % — SIGNIFICANT CHANGE UP (ref 43–77)
NEUTROPHILS # BLD AUTO: 6.65 K/UL — SIGNIFICANT CHANGE UP (ref 1.8–7.4)
NEUTROPHILS NFR BLD AUTO: 52.9 % — SIGNIFICANT CHANGE UP (ref 43–77)
NITRITE UR-MCNC: NEGATIVE — SIGNIFICANT CHANGE UP
NRBC # FLD: 0 — SIGNIFICANT CHANGE UP
OPIATES UR-MCNC: NEGATIVE — SIGNIFICANT CHANGE UP
OXYCODONE UR-MCNC: NEGATIVE — SIGNIFICANT CHANGE UP
PCP UR-MCNC: NEGATIVE — SIGNIFICANT CHANGE UP
PH UR: 6 — SIGNIFICANT CHANGE UP (ref 4.6–8)
PLATELET # BLD AUTO: 292 K/UL — SIGNIFICANT CHANGE UP (ref 150–400)
PLATELET COUNT - ESTIMATE: NORMAL — SIGNIFICANT CHANGE UP
PMV BLD: 8.9 FL — SIGNIFICANT CHANGE UP (ref 7–13)
POTASSIUM SERPL-MCNC: 4.5 MMOL/L — SIGNIFICANT CHANGE UP (ref 3.5–5.3)
POTASSIUM SERPL-SCNC: 4.5 MMOL/L — SIGNIFICANT CHANGE UP (ref 3.5–5.3)
PROT SERPL-MCNC: 7.8 G/DL — SIGNIFICANT CHANGE UP (ref 6–8.3)
PROT UR-MCNC: 100 — SIGNIFICANT CHANGE UP
RBC # BLD: 5.31 M/UL — SIGNIFICANT CHANGE UP (ref 4.2–5.8)
RBC # FLD: 12.7 % — SIGNIFICANT CHANGE UP (ref 10.3–14.5)
RBC CASTS # UR COMP ASSIST: SIGNIFICANT CHANGE UP (ref 0–?)
REVIEW TO FOLLOW: YES — SIGNIFICANT CHANGE UP
SALICYLATES SERPL-MCNC: < 5 MG/DL — LOW (ref 15–30)
SODIUM SERPL-SCNC: 144 MMOL/L — SIGNIFICANT CHANGE UP (ref 135–145)
SP GR SPEC: 1.04 — HIGH (ref 1–1.03)
SQUAMOUS # UR AUTO: SIGNIFICANT CHANGE UP
TSH SERPL-MCNC: 1.31 UIU/ML — SIGNIFICANT CHANGE UP (ref 0.27–4.2)
UROBILINOGEN FLD QL: 1 E.U. — SIGNIFICANT CHANGE UP (ref 0.1–0.2)
VARIANT LYMPHS # BLD: 7.9 % — SIGNIFICANT CHANGE UP
WBC # BLD: 12.57 K/UL — HIGH (ref 3.8–10.5)
WBC # FLD AUTO: 12.57 K/UL — HIGH (ref 3.8–10.5)
WBC UR QL: SIGNIFICANT CHANGE UP (ref 0–?)

## 2017-10-17 PROCEDURE — 99285 EMERGENCY DEPT VISIT HI MDM: CPT

## 2017-10-17 RX ORDER — HALOPERIDOL DECANOATE 100 MG/ML
5 INJECTION INTRAMUSCULAR EVERY 6 HOURS
Qty: 0 | Refills: 0 | Status: DISCONTINUED | OUTPATIENT
Start: 2017-10-17 | End: 2017-11-01

## 2017-10-17 RX ORDER — BUDESONIDE AND FORMOTEROL FUMARATE DIHYDRATE 160; 4.5 UG/1; UG/1
2 AEROSOL RESPIRATORY (INHALATION)
Qty: 0 | Refills: 0 | Status: DISCONTINUED | OUTPATIENT
Start: 2017-10-17 | End: 2017-11-01

## 2017-10-17 RX ORDER — OXCARBAZEPINE 300 MG/1
600 TABLET, FILM COATED ORAL
Qty: 0 | Refills: 0 | Status: DISCONTINUED | OUTPATIENT
Start: 2017-10-17 | End: 2017-11-01

## 2017-10-17 RX ORDER — ARIPIPRAZOLE 15 MG/1
20 TABLET ORAL DAILY
Qty: 0 | Refills: 0 | Status: DISCONTINUED | OUTPATIENT
Start: 2017-10-17 | End: 2017-10-18

## 2017-10-17 RX ORDER — NICOTINE POLACRILEX 2 MG
1 GUM BUCCAL THREE TIMES A DAY
Qty: 0 | Refills: 0 | Status: DISCONTINUED | OUTPATIENT
Start: 2017-10-17 | End: 2017-11-01

## 2017-10-17 RX ORDER — HALOPERIDOL DECANOATE 100 MG/ML
5 INJECTION INTRAMUSCULAR ONCE
Qty: 0 | Refills: 0 | Status: COMPLETED | OUTPATIENT
Start: 2017-10-17 | End: 2017-10-17

## 2017-10-17 RX ORDER — HALOPERIDOL DECANOATE 100 MG/ML
5 INJECTION INTRAMUSCULAR ONCE
Qty: 0 | Refills: 0 | Status: DISCONTINUED | OUTPATIENT
Start: 2017-10-17 | End: 2017-11-01

## 2017-10-17 RX ADMIN — Medication 2 MILLIGRAM(S): at 16:30

## 2017-10-17 RX ADMIN — HALOPERIDOL DECANOATE 5 MILLIGRAM(S): 100 INJECTION INTRAMUSCULAR at 16:31

## 2017-10-17 RX ADMIN — OXCARBAZEPINE 600 MILLIGRAM(S): 300 TABLET, FILM COATED ORAL at 20:57

## 2017-10-17 NOTE — ED BEHAVIORAL HEALTH ASSESSMENT NOTE - DETAILS
[facility] 10/6/17-10/12/17 See above leg pain Records reviewed on CVM Pain "all over the body." body pain Dr. Lynch Handoff given to LINK, Dr. Turner.

## 2017-10-17 NOTE — ED PROVIDER NOTE - CHPI ED SYMPTOMS NEG
no disorientation/no confusion/no change in level of consciousness/no hallucinations/no paranoia/no weakness/no weight loss/no homicidal/no suicidal

## 2017-10-17 NOTE — ED PROVIDER NOTE - OBJECTIVE STATEMENT
The patient is a 27y Male hx of schizophrenia, OCD, b/p, polysubstance abuse brought to ed for psych eval c/o generalized nerve pain.  As per report, pt was agitated and uncooperative at the scene but not aggression toward staff.  Pt denies all other medical complaints at presents, no weakness, numbness or tingling to his extremities, no recent injuries, trauma or falls, no headache, back or neck pain, no abd/flank pain, no uti/urinary symptoms, nausea or vomiting, no fever or chills, no cp or sob, no palpitations or diaphoresis.  Denies SI/HI, no AVH.  Pt requesting IM Haldol and ativan for his agitation.  Endorsed no other symptoms.

## 2017-10-17 NOTE — ED ADULT NURSE NOTE - CHIEF COMPLAINT QUOTE
pt coming from [facility] out patient, c/o full body nerve pain and dizziness. Has a hx of schizophrenia and bipolar.  denies any si/hi, auditory hallucinations or visual hallucinations. pt calm and cooperative.  Pt wants medical clearance, psychiatrist wants psych eval. Per [facility] eval, pt to go to

## 2017-10-17 NOTE — ED BEHAVIORAL HEALTH ASSESSMENT NOTE - DESCRIPTION (FIRST USE, LAST USE, QUANTITY, FREQUENCY, DURATION)
See HPI Reports history of marijuana use, pt reports he used once yesterday; UTOX positive for cannabis in the past; Utox negative for cocaine Reports history of marijuana use. Psychiatrist suspects possible K2 use. in the past

## 2017-10-17 NOTE — ED BEHAVIORAL HEALTH ASSESSMENT NOTE - OTHER
psychiatrist, Dr. Lynch ([id number]) substance use and leg pain fair somewhat increased rate, however not pressured at times irritable when discussing mom not believing he has pain substance substance use and body pain

## 2017-10-17 NOTE — ED BEHAVIORAL HEALTH ASSESSMENT NOTE - SUICIDE PROTECTIVE FACTORS
Supportive social network or family/Positive therapeutic relationships/Fear of death or dying due to pain/suffering/Identifies reasons for living/Responsibility to family and others/Future oriented

## 2017-10-17 NOTE — ED BEHAVIORAL HEALTH ASSESSMENT NOTE - SUICIDE RISK FACTORS
Agitation/severe anxiety/Substance abuse/dependence/Mood episode/Perceived burden on family and others Agitation/severe anxiety/Substance abuse/dependence/Perceived burden on family and others/Mood episode/Chronic pain or acute medical issue/Highly impulsive behavior

## 2017-10-17 NOTE — ED ADULT TRIAGE NOTE - CHIEF COMPLAINT QUOTE
pt coming from [facility] out patient, c/o full body nerve pain and dizziness. Has a hx of schizophrenia and bipolar.  denies any si/hi, auditory hallucinations or visual hallucinations. pt calm and cooperative.  Pt wants medical clearance, psychiatrist wants psych eval. pt coming from [facility] out patient, c/o full body nerve pain and dizziness. Has a hx of schizophrenia and bipolar.  denies any si/hi, auditory hallucinations or visual hallucinations. pt calm and cooperative.  Pt wants medical clearance, psychiatrist wants psych eval. Per [facility] eval, pt to go to

## 2017-10-17 NOTE — ED BEHAVIORAL HEALTH ASSESSMENT NOTE - NS ED [facility] ED COURSE PSYCHIATRIC MEDICATION GIVEN
Voluntary Intramuscular (indication, name, dose, time) None/Voluntary Intramuscular (indication, name, dose, time) None

## 2017-10-17 NOTE — ED ADULT NURSE REASSESSMENT NOTE - NS ED NURSE REASSESS COMMENT FT1
pt restless. becoming agitated, banging on walls, requesting ativan and haldol. NP Lucita aware. pt medicated as ordered.

## 2017-10-17 NOTE — ED BEHAVIORAL HEALTH ASSESSMENT NOTE - SUMMARY
Pt is a  26 yo [place] M, single w/ no dependents, living with parents, former college student at [employer], w/ PPHx of bipolar I disorder with psychotic features, history of multiple inpatient psychiatric admissions, most recently at [facility] from 10/6/17 to 10/12/17. Denies h/o suicide attempts, no h/o violence/arrests. Has h/o cannabis and cocaine use, no reported history of alcohol dependence/DTs/complicated withdrawals, currently in treatment with Dr Lynch at [facility] (last seen on today), who was referred to ER by Dr. Lynch.     Upon assessment, Pt is irritable and states he went to see Dr. Lynch today and he came to ER because of "pain all over the body." Pt states he was given Abilify Meintenna and also taking Abilify PO. He reports compliance and states he has been sleeping well. He denies A/V hallucinations and suicidal/homicidal ideations. Pt is a poor historian. Pt is extremely guarded. When asked what was bothering him, Pt says "emotion dysregulation," "impulsive behavior" but unable to elaborate.     I spoke with Pt's psychiatrist, Dr. Lynch, who stated he had seen the Pt today and he was grandiose, not sleeping, and labile. Pt also exhibits disorganized behaviors/thoughts. Dr. Lynch states Pt is also profoundly depressed at baseline. No previous suicidal/homicidal ideations, intent or plan. He believes Pt smokes marijuana and possibly K2. He believes Pt is more psychotic than manic and needs inpatient admission.     I also spoke with Pt's mother, Divya Maria [phone] who stated Pt had not been sleeping at all and agitated. Mom states Pt has been restless and unable to sit still for the past one week. He is also obsessed with being on instagram and grandiose about him being "a famous person." Pt has also been saying that he has a lot of money ($2000) in his bank account. Pt was discharged from [facility] Low 3 on 10/12/17 and mother felt at that time that Pt was not ready to be discharged. Since discharge, Pt has been fighting and yelling, screaming at mom. She also states she suspects medication noncompliance as she has noticed untouched liquid bottle of Abilify. She further states that the body pain that Pt has been complaining has been completely worked up and they did not find any medical cause of pain. She is also advocating for Pt's admission.
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
## 2017-10-17 NOTE — ED ADULT NURSE NOTE - OBJECTIVE STATEMENT
Pt received in  area, aaox3 with c/o "my body hurts". Pt restless but re-directable. Denies SI/HI/Hallucinations/etoh-substance use

## 2017-10-17 NOTE — ED BEHAVIORAL HEALTH ASSESSMENT NOTE - DESCRIPTION
Initially agitated/disorganized, required voluntary Haldol and Ativan with good effect. During the interview he was calm and cooperative. None See HPI Pt is irritable and superficially cooperative. No meds given. body pain

## 2017-10-17 NOTE — ED BEHAVIORAL HEALTH ASSESSMENT NOTE - NS ED BHA MED ROS RESPIRATORY
Inform that AIC has improved, down to 5.9. Chol is 189, LDL of 106, HDL of 34. Trig at 245. Encouraged continued compliance with use of atorvastatin. Refill it for the year. No change in dose. Kidney function is stable.    No complaints

## 2017-10-17 NOTE — ED BEHAVIORAL HEALTH ASSESSMENT NOTE - RISK ASSESSMENT
Pt is acutely psychotic, has been aggressive towards parents, and medication noncompliance is suspected, hence Pt poses a risk to self and others and is in need of further stabilization in inpatient setting.

## 2017-10-18 PROCEDURE — 99223 1ST HOSP IP/OBS HIGH 75: CPT

## 2017-10-18 RX ORDER — IBUPROFEN 200 MG
600 TABLET ORAL ONCE
Qty: 0 | Refills: 0 | Status: COMPLETED | OUTPATIENT
Start: 2017-10-18 | End: 2017-10-18

## 2017-10-18 RX ORDER — SIMETHICONE 80 MG/1
80 TABLET, CHEWABLE ORAL DAILY
Qty: 0 | Refills: 0 | Status: DISCONTINUED | OUTPATIENT
Start: 2017-10-18 | End: 2017-10-18

## 2017-10-18 RX ORDER — SIMETHICONE 80 MG/1
80 TABLET, CHEWABLE ORAL EVERY 6 HOURS
Qty: 0 | Refills: 0 | Status: DISCONTINUED | OUTPATIENT
Start: 2017-10-18 | End: 2017-11-01

## 2017-10-18 RX ORDER — POLYETHYLENE GLYCOL 3350 17 G/17G
17 POWDER, FOR SOLUTION ORAL DAILY
Qty: 0 | Refills: 0 | Status: DISCONTINUED | OUTPATIENT
Start: 2017-10-18 | End: 2017-11-01

## 2017-10-18 RX ORDER — NICOTINE POLACRILEX 2 MG
1 GUM BUCCAL DAILY
Qty: 0 | Refills: 0 | Status: DISCONTINUED | OUTPATIENT
Start: 2017-10-18 | End: 2017-11-01

## 2017-10-18 RX ORDER — NICOTINE POLACRILEX 2 MG
2 GUM BUCCAL
Qty: 0 | Refills: 0 | Status: DISCONTINUED | OUTPATIENT
Start: 2017-10-18 | End: 2017-11-01

## 2017-10-18 RX ADMIN — SIMETHICONE 80 MILLIGRAM(S): 80 TABLET, CHEWABLE ORAL at 13:15

## 2017-10-18 RX ADMIN — Medication 2 MILLIGRAM(S): at 19:29

## 2017-10-18 RX ADMIN — Medication 1 PATCH: at 16:01

## 2017-10-18 RX ADMIN — Medication 1 EACH: at 17:10

## 2017-10-18 RX ADMIN — Medication 1 EACH: at 09:18

## 2017-10-18 RX ADMIN — OXCARBAZEPINE 600 MILLIGRAM(S): 300 TABLET, FILM COATED ORAL at 08:00

## 2017-10-18 RX ADMIN — SIMETHICONE 80 MILLIGRAM(S): 80 TABLET, CHEWABLE ORAL at 18:01

## 2017-10-18 RX ADMIN — BUDESONIDE AND FORMOTEROL FUMARATE DIHYDRATE 2 PUFF(S): 160; 4.5 AEROSOL RESPIRATORY (INHALATION) at 20:56

## 2017-10-18 RX ADMIN — Medication 600 MILLIGRAM(S): at 22:15

## 2017-10-18 RX ADMIN — Medication 2 MILLIGRAM(S): at 18:55

## 2017-10-18 RX ADMIN — Medication 600 MILLIGRAM(S): at 22:45

## 2017-10-18 RX ADMIN — OXCARBAZEPINE 600 MILLIGRAM(S): 300 TABLET, FILM COATED ORAL at 20:56

## 2017-10-18 RX ADMIN — Medication 600 MILLIGRAM(S): at 02:00

## 2017-10-18 RX ADMIN — POLYETHYLENE GLYCOL 3350 17 GRAM(S): 17 POWDER, FOR SOLUTION ORAL at 08:00

## 2017-10-18 RX ADMIN — Medication 2 MILLIGRAM(S): at 16:01

## 2017-10-19 PROCEDURE — 90853 GROUP PSYCHOTHERAPY: CPT

## 2017-10-19 PROCEDURE — 99232 SBSQ HOSP IP/OBS MODERATE 35: CPT | Mod: 25

## 2017-10-19 RX ORDER — DIPHENHYDRAMINE HCL 50 MG
50 CAPSULE ORAL ONCE
Qty: 0 | Refills: 0 | Status: COMPLETED | OUTPATIENT
Start: 2017-10-19 | End: 2017-10-19

## 2017-10-19 RX ORDER — TRAZODONE HCL 50 MG
100 TABLET ORAL ONCE
Qty: 0 | Refills: 0 | Status: COMPLETED | OUTPATIENT
Start: 2017-10-19 | End: 2017-10-19

## 2017-10-19 RX ORDER — ACETAMINOPHEN 500 MG
650 TABLET ORAL EVERY 6 HOURS
Qty: 0 | Refills: 0 | Status: DISCONTINUED | OUTPATIENT
Start: 2017-10-19 | End: 2017-11-01

## 2017-10-19 RX ORDER — GABAPENTIN 400 MG/1
400 CAPSULE ORAL ONCE
Qty: 0 | Refills: 0 | Status: COMPLETED | OUTPATIENT
Start: 2017-10-19 | End: 2017-10-19

## 2017-10-19 RX ADMIN — Medication 100 MILLIGRAM(S): at 22:45

## 2017-10-19 RX ADMIN — HALOPERIDOL DECANOATE 5 MILLIGRAM(S): 100 INJECTION INTRAMUSCULAR at 20:06

## 2017-10-19 RX ADMIN — Medication 1 PATCH: at 08:38

## 2017-10-19 RX ADMIN — Medication 650 MILLIGRAM(S): at 21:28

## 2017-10-19 RX ADMIN — SIMETHICONE 80 MILLIGRAM(S): 80 TABLET, CHEWABLE ORAL at 12:52

## 2017-10-19 RX ADMIN — HALOPERIDOL DECANOATE 5 MILLIGRAM(S): 100 INJECTION INTRAMUSCULAR at 04:25

## 2017-10-19 RX ADMIN — BUDESONIDE AND FORMOTEROL FUMARATE DIHYDRATE 2 PUFF(S): 160; 4.5 AEROSOL RESPIRATORY (INHALATION) at 15:29

## 2017-10-19 RX ADMIN — GABAPENTIN 400 MILLIGRAM(S): 400 CAPSULE ORAL at 02:30

## 2017-10-19 RX ADMIN — Medication 2 MILLIGRAM(S): at 04:25

## 2017-10-19 RX ADMIN — BUDESONIDE AND FORMOTEROL FUMARATE DIHYDRATE 2 PUFF(S): 160; 4.5 AEROSOL RESPIRATORY (INHALATION) at 21:28

## 2017-10-19 RX ADMIN — POLYETHYLENE GLYCOL 3350 17 GRAM(S): 17 POWDER, FOR SOLUTION ORAL at 08:38

## 2017-10-19 RX ADMIN — Medication 650 MILLIGRAM(S): at 05:00

## 2017-10-19 RX ADMIN — OXCARBAZEPINE 600 MILLIGRAM(S): 300 TABLET, FILM COATED ORAL at 08:38

## 2017-10-19 RX ADMIN — Medication 2 MILLIGRAM(S): at 20:07

## 2017-10-19 RX ADMIN — Medication 650 MILLIGRAM(S): at 22:08

## 2017-10-19 RX ADMIN — Medication 650 MILLIGRAM(S): at 05:30

## 2017-10-19 RX ADMIN — Medication 2 MILLIGRAM(S): at 15:29

## 2017-10-19 RX ADMIN — Medication 50 MILLIGRAM(S): at 20:06

## 2017-10-19 RX ADMIN — OXCARBAZEPINE 600 MILLIGRAM(S): 300 TABLET, FILM COATED ORAL at 20:06

## 2017-10-20 PROCEDURE — 99232 SBSQ HOSP IP/OBS MODERATE 35: CPT

## 2017-10-20 RX ORDER — FLUPHENAZINE HYDROCHLORIDE 1 MG/1
5 TABLET, FILM COATED ORAL AT BEDTIME
Qty: 0 | Refills: 0 | Status: DISCONTINUED | OUTPATIENT
Start: 2017-10-20 | End: 2017-11-01

## 2017-10-20 RX ORDER — DIPHENHYDRAMINE HCL 50 MG
50 CAPSULE ORAL EVERY 6 HOURS
Qty: 0 | Refills: 0 | Status: DISCONTINUED | OUTPATIENT
Start: 2017-10-20 | End: 2017-11-01

## 2017-10-20 RX ORDER — BENZTROPINE MESYLATE 1 MG
1 TABLET ORAL DAILY
Qty: 0 | Refills: 0 | Status: DISCONTINUED | OUTPATIENT
Start: 2017-10-20 | End: 2017-11-01

## 2017-10-20 RX ORDER — GABAPENTIN 400 MG/1
400 CAPSULE ORAL
Qty: 0 | Refills: 0 | Status: DISCONTINUED | OUTPATIENT
Start: 2017-10-20 | End: 2017-10-27

## 2017-10-20 RX ADMIN — POLYETHYLENE GLYCOL 3350 17 GRAM(S): 17 POWDER, FOR SOLUTION ORAL at 08:00

## 2017-10-20 RX ADMIN — Medication 2 MILLIGRAM(S): at 08:01

## 2017-10-20 RX ADMIN — Medication 1 PATCH: at 14:36

## 2017-10-20 RX ADMIN — Medication 650 MILLIGRAM(S): at 20:45

## 2017-10-20 RX ADMIN — HALOPERIDOL DECANOATE 5 MILLIGRAM(S): 100 INJECTION INTRAMUSCULAR at 20:04

## 2017-10-20 RX ADMIN — Medication 1 EACH: at 07:58

## 2017-10-20 RX ADMIN — GABAPENTIN 400 MILLIGRAM(S): 400 CAPSULE ORAL at 13:07

## 2017-10-20 RX ADMIN — OXCARBAZEPINE 600 MILLIGRAM(S): 300 TABLET, FILM COATED ORAL at 08:00

## 2017-10-20 RX ADMIN — Medication 50 MILLIGRAM(S): at 20:04

## 2017-10-20 RX ADMIN — Medication 2 MILLIGRAM(S): at 20:04

## 2017-10-20 RX ADMIN — Medication 650 MILLIGRAM(S): at 06:00

## 2017-10-20 RX ADMIN — BUDESONIDE AND FORMOTEROL FUMARATE DIHYDRATE 2 PUFF(S): 160; 4.5 AEROSOL RESPIRATORY (INHALATION) at 14:36

## 2017-10-20 RX ADMIN — OXCARBAZEPINE 600 MILLIGRAM(S): 300 TABLET, FILM COATED ORAL at 20:04

## 2017-10-20 RX ADMIN — GABAPENTIN 400 MILLIGRAM(S): 400 CAPSULE ORAL at 20:04

## 2017-10-20 RX ADMIN — Medication 1 PATCH: at 08:01

## 2017-10-20 RX ADMIN — Medication 650 MILLIGRAM(S): at 21:26

## 2017-10-20 RX ADMIN — FLUPHENAZINE HYDROCHLORIDE 5 MILLIGRAM(S): 1 TABLET, FILM COATED ORAL at 20:04

## 2017-10-20 RX ADMIN — Medication 650 MILLIGRAM(S): at 05:39

## 2017-10-20 RX ADMIN — BUDESONIDE AND FORMOTEROL FUMARATE DIHYDRATE 2 PUFF(S): 160; 4.5 AEROSOL RESPIRATORY (INHALATION) at 20:03

## 2017-10-21 PROCEDURE — 99232 SBSQ HOSP IP/OBS MODERATE 35: CPT

## 2017-10-21 RX ORDER — IBUPROFEN 200 MG
400 TABLET ORAL EVERY 6 HOURS
Qty: 0 | Refills: 0 | Status: DISCONTINUED | OUTPATIENT
Start: 2017-10-21 | End: 2017-11-01

## 2017-10-21 RX ADMIN — GABAPENTIN 400 MILLIGRAM(S): 400 CAPSULE ORAL at 20:05

## 2017-10-21 RX ADMIN — Medication 1 MILLIGRAM(S): at 08:56

## 2017-10-21 RX ADMIN — Medication 650 MILLIGRAM(S): at 14:07

## 2017-10-21 RX ADMIN — OXCARBAZEPINE 600 MILLIGRAM(S): 300 TABLET, FILM COATED ORAL at 08:56

## 2017-10-21 RX ADMIN — Medication 1 EACH: at 20:05

## 2017-10-21 RX ADMIN — Medication 650 MILLIGRAM(S): at 04:15

## 2017-10-21 RX ADMIN — Medication 2 MILLIGRAM(S): at 14:05

## 2017-10-21 RX ADMIN — BUDESONIDE AND FORMOTEROL FUMARATE DIHYDRATE 2 PUFF(S): 160; 4.5 AEROSOL RESPIRATORY (INHALATION) at 14:00

## 2017-10-21 RX ADMIN — Medication 2 MILLIGRAM(S): at 04:15

## 2017-10-21 RX ADMIN — Medication 2 MILLIGRAM(S): at 20:05

## 2017-10-21 RX ADMIN — OXCARBAZEPINE 600 MILLIGRAM(S): 300 TABLET, FILM COATED ORAL at 20:05

## 2017-10-21 RX ADMIN — Medication 1 PATCH: at 08:56

## 2017-10-21 RX ADMIN — Medication 50 MILLIGRAM(S): at 10:15

## 2017-10-21 RX ADMIN — Medication 650 MILLIGRAM(S): at 05:20

## 2017-10-21 RX ADMIN — POLYETHYLENE GLYCOL 3350 17 GRAM(S): 17 POWDER, FOR SOLUTION ORAL at 08:56

## 2017-10-21 RX ADMIN — BUDESONIDE AND FORMOTEROL FUMARATE DIHYDRATE 2 PUFF(S): 160; 4.5 AEROSOL RESPIRATORY (INHALATION) at 20:05

## 2017-10-21 RX ADMIN — Medication 400 MILLIGRAM(S): at 23:30

## 2017-10-21 RX ADMIN — HALOPERIDOL DECANOATE 5 MILLIGRAM(S): 100 INJECTION INTRAMUSCULAR at 10:16

## 2017-10-21 RX ADMIN — HALOPERIDOL DECANOATE 5 MILLIGRAM(S): 100 INJECTION INTRAMUSCULAR at 04:15

## 2017-10-21 RX ADMIN — Medication 50 MILLIGRAM(S): at 04:15

## 2017-10-21 RX ADMIN — Medication 2 MILLIGRAM(S): at 10:16

## 2017-10-21 RX ADMIN — Medication 400 MILLIGRAM(S): at 17:28

## 2017-10-21 RX ADMIN — FLUPHENAZINE HYDROCHLORIDE 5 MILLIGRAM(S): 1 TABLET, FILM COATED ORAL at 20:05

## 2017-10-21 RX ADMIN — GABAPENTIN 400 MILLIGRAM(S): 400 CAPSULE ORAL at 08:56

## 2017-10-21 RX ADMIN — Medication 650 MILLIGRAM(S): at 21:00

## 2017-10-21 RX ADMIN — Medication 650 MILLIGRAM(S): at 20:30

## 2017-10-22 PROCEDURE — 99232 SBSQ HOSP IP/OBS MODERATE 35: CPT

## 2017-10-22 RX ADMIN — GABAPENTIN 400 MILLIGRAM(S): 400 CAPSULE ORAL at 09:57

## 2017-10-22 RX ADMIN — Medication 650 MILLIGRAM(S): at 18:15

## 2017-10-22 RX ADMIN — Medication 650 MILLIGRAM(S): at 18:10

## 2017-10-22 RX ADMIN — BUDESONIDE AND FORMOTEROL FUMARATE DIHYDRATE 2 PUFF(S): 160; 4.5 AEROSOL RESPIRATORY (INHALATION) at 09:57

## 2017-10-22 RX ADMIN — HALOPERIDOL DECANOATE 5 MILLIGRAM(S): 100 INJECTION INTRAMUSCULAR at 09:20

## 2017-10-22 RX ADMIN — Medication 400 MILLIGRAM(S): at 16:18

## 2017-10-22 RX ADMIN — OXCARBAZEPINE 600 MILLIGRAM(S): 300 TABLET, FILM COATED ORAL at 09:57

## 2017-10-22 RX ADMIN — Medication 50 MILLIGRAM(S): at 09:20

## 2017-10-22 RX ADMIN — BUDESONIDE AND FORMOTEROL FUMARATE DIHYDRATE 2 PUFF(S): 160; 4.5 AEROSOL RESPIRATORY (INHALATION) at 20:19

## 2017-10-22 RX ADMIN — Medication 400 MILLIGRAM(S): at 12:19

## 2017-10-22 RX ADMIN — Medication 1 PATCH: at 09:57

## 2017-10-22 RX ADMIN — Medication 400 MILLIGRAM(S): at 17:20

## 2017-10-22 RX ADMIN — GABAPENTIN 400 MILLIGRAM(S): 400 CAPSULE ORAL at 20:19

## 2017-10-22 RX ADMIN — Medication 400 MILLIGRAM(S): at 00:00

## 2017-10-22 RX ADMIN — SIMETHICONE 80 MILLIGRAM(S): 80 TABLET, CHEWABLE ORAL at 05:45

## 2017-10-22 RX ADMIN — Medication 1 MILLIGRAM(S): at 05:11

## 2017-10-22 RX ADMIN — Medication 400 MILLIGRAM(S): at 06:00

## 2017-10-22 RX ADMIN — Medication 650 MILLIGRAM(S): at 05:12

## 2017-10-22 RX ADMIN — Medication 2 MILLIGRAM(S): at 09:20

## 2017-10-22 RX ADMIN — Medication 1 MILLIGRAM(S): at 09:57

## 2017-10-22 RX ADMIN — OXCARBAZEPINE 600 MILLIGRAM(S): 300 TABLET, FILM COATED ORAL at 20:19

## 2017-10-22 RX ADMIN — POLYETHYLENE GLYCOL 3350 17 GRAM(S): 17 POWDER, FOR SOLUTION ORAL at 08:34

## 2017-10-22 RX ADMIN — FLUPHENAZINE HYDROCHLORIDE 5 MILLIGRAM(S): 1 TABLET, FILM COATED ORAL at 20:19

## 2017-10-23 PROCEDURE — 99232 SBSQ HOSP IP/OBS MODERATE 35: CPT

## 2017-10-23 RX ADMIN — Medication 1 PATCH: at 13:49

## 2017-10-23 RX ADMIN — Medication 400 MILLIGRAM(S): at 05:58

## 2017-10-23 RX ADMIN — Medication 400 MILLIGRAM(S): at 17:38

## 2017-10-23 RX ADMIN — Medication 50 MILLIGRAM(S): at 07:40

## 2017-10-23 RX ADMIN — Medication 400 MILLIGRAM(S): at 06:30

## 2017-10-23 RX ADMIN — Medication 1 MILLIGRAM(S): at 09:15

## 2017-10-23 RX ADMIN — OXCARBAZEPINE 600 MILLIGRAM(S): 300 TABLET, FILM COATED ORAL at 09:15

## 2017-10-23 RX ADMIN — Medication 1 PATCH: at 09:15

## 2017-10-23 RX ADMIN — GABAPENTIN 400 MILLIGRAM(S): 400 CAPSULE ORAL at 09:15

## 2017-10-23 RX ADMIN — Medication 2 MILLIGRAM(S): at 07:40

## 2017-10-23 RX ADMIN — BUDESONIDE AND FORMOTEROL FUMARATE DIHYDRATE 2 PUFF(S): 160; 4.5 AEROSOL RESPIRATORY (INHALATION) at 21:09

## 2017-10-23 RX ADMIN — FLUPHENAZINE HYDROCHLORIDE 5 MILLIGRAM(S): 1 TABLET, FILM COATED ORAL at 20:46

## 2017-10-23 RX ADMIN — BUDESONIDE AND FORMOTEROL FUMARATE DIHYDRATE 2 PUFF(S): 160; 4.5 AEROSOL RESPIRATORY (INHALATION) at 08:38

## 2017-10-23 RX ADMIN — OXCARBAZEPINE 600 MILLIGRAM(S): 300 TABLET, FILM COATED ORAL at 20:46

## 2017-10-23 RX ADMIN — Medication 2 MILLIGRAM(S): at 01:08

## 2017-10-23 RX ADMIN — HALOPERIDOL DECANOATE 5 MILLIGRAM(S): 100 INJECTION INTRAMUSCULAR at 01:08

## 2017-10-23 RX ADMIN — POLYETHYLENE GLYCOL 3350 17 GRAM(S): 17 POWDER, FOR SOLUTION ORAL at 09:15

## 2017-10-23 RX ADMIN — Medication 50 MILLIGRAM(S): at 01:08

## 2017-10-23 RX ADMIN — HALOPERIDOL DECANOATE 5 MILLIGRAM(S): 100 INJECTION INTRAMUSCULAR at 07:40

## 2017-10-23 RX ADMIN — Medication 1 EACH: at 06:34

## 2017-10-23 RX ADMIN — GABAPENTIN 400 MILLIGRAM(S): 400 CAPSULE ORAL at 20:46

## 2017-10-24 PROCEDURE — 99232 SBSQ HOSP IP/OBS MODERATE 35: CPT | Mod: 25

## 2017-10-24 PROCEDURE — 90853 GROUP PSYCHOTHERAPY: CPT

## 2017-10-24 RX ORDER — ACETAMINOPHEN 500 MG
650 TABLET ORAL ONCE
Qty: 0 | Refills: 0 | Status: COMPLETED | OUTPATIENT
Start: 2017-10-24 | End: 2017-10-24

## 2017-10-24 RX ORDER — ONDANSETRON 8 MG/1
4 TABLET, FILM COATED ORAL ONCE
Qty: 0 | Refills: 0 | Status: COMPLETED | OUTPATIENT
Start: 2017-10-24 | End: 2017-10-25

## 2017-10-24 RX ORDER — PHENYLEPHRINE-SHARK LIVER OIL-MINERAL OIL-PETROLATUM RECTAL OINTMENT
1 OINTMENT (GRAM) RECTAL
Qty: 0 | Refills: 0 | Status: DISCONTINUED | OUTPATIENT
Start: 2017-10-24 | End: 2017-11-01

## 2017-10-24 RX ADMIN — PHENYLEPHRINE-SHARK LIVER OIL-MINERAL OIL-PETROLATUM RECTAL OINTMENT 1 APPLICATION(S): at 18:39

## 2017-10-24 RX ADMIN — OXCARBAZEPINE 600 MILLIGRAM(S): 300 TABLET, FILM COATED ORAL at 08:12

## 2017-10-24 RX ADMIN — Medication 650 MILLIGRAM(S): at 20:46

## 2017-10-24 RX ADMIN — Medication 1 PATCH: at 08:12

## 2017-10-24 RX ADMIN — Medication 1 EACH: at 07:40

## 2017-10-24 RX ADMIN — Medication 2 MILLIGRAM(S): at 21:33

## 2017-10-24 RX ADMIN — Medication 400 MILLIGRAM(S): at 15:10

## 2017-10-24 RX ADMIN — SIMETHICONE 80 MILLIGRAM(S): 80 TABLET, CHEWABLE ORAL at 06:41

## 2017-10-24 RX ADMIN — Medication 1 MILLIGRAM(S): at 08:12

## 2017-10-24 RX ADMIN — GABAPENTIN 400 MILLIGRAM(S): 400 CAPSULE ORAL at 20:46

## 2017-10-24 RX ADMIN — Medication 1 EACH: at 15:12

## 2017-10-24 RX ADMIN — POLYETHYLENE GLYCOL 3350 17 GRAM(S): 17 POWDER, FOR SOLUTION ORAL at 08:12

## 2017-10-24 RX ADMIN — GABAPENTIN 400 MILLIGRAM(S): 400 CAPSULE ORAL at 08:12

## 2017-10-24 RX ADMIN — BUDESONIDE AND FORMOTEROL FUMARATE DIHYDRATE 2 PUFF(S): 160; 4.5 AEROSOL RESPIRATORY (INHALATION) at 07:40

## 2017-10-24 RX ADMIN — OXCARBAZEPINE 600 MILLIGRAM(S): 300 TABLET, FILM COATED ORAL at 20:46

## 2017-10-24 RX ADMIN — FLUPHENAZINE HYDROCHLORIDE 5 MILLIGRAM(S): 1 TABLET, FILM COATED ORAL at 20:46

## 2017-10-25 PROCEDURE — 99232 SBSQ HOSP IP/OBS MODERATE 35: CPT

## 2017-10-25 RX ORDER — OXCARBAZEPINE 300 MG/1
1 TABLET, FILM COATED ORAL
Qty: 60 | Refills: 0 | OUTPATIENT
Start: 2017-10-25 | End: 2017-11-24

## 2017-10-25 RX ORDER — BENZTROPINE MESYLATE 1 MG
1 TABLET ORAL
Qty: 30 | Refills: 0 | OUTPATIENT
Start: 2017-10-25 | End: 2017-11-24

## 2017-10-25 RX ORDER — GABAPENTIN 400 MG/1
1 CAPSULE ORAL
Qty: 60 | Refills: 0 | OUTPATIENT
Start: 2017-10-25 | End: 2017-11-24

## 2017-10-25 RX ORDER — FLUPHENAZINE HYDROCHLORIDE 1 MG/1
1 TABLET, FILM COATED ORAL
Qty: 30 | Refills: 0 | OUTPATIENT
Start: 2017-10-25 | End: 2017-11-24

## 2017-10-25 RX ORDER — ARIPIPRAZOLE 15 MG/1
1 TABLET ORAL
Qty: 30 | Refills: 0 | OUTPATIENT
Start: 2017-10-25 | End: 2017-11-24

## 2017-10-25 RX ADMIN — BUDESONIDE AND FORMOTEROL FUMARATE DIHYDRATE 2 PUFF(S): 160; 4.5 AEROSOL RESPIRATORY (INHALATION) at 11:35

## 2017-10-25 RX ADMIN — Medication 50 MILLIGRAM(S): at 11:49

## 2017-10-25 RX ADMIN — ONDANSETRON 4 MILLIGRAM(S): 8 TABLET, FILM COATED ORAL at 22:36

## 2017-10-25 RX ADMIN — Medication 400 MILLIGRAM(S): at 11:48

## 2017-10-25 RX ADMIN — OXCARBAZEPINE 600 MILLIGRAM(S): 300 TABLET, FILM COATED ORAL at 10:00

## 2017-10-25 RX ADMIN — Medication 1 PATCH: at 11:25

## 2017-10-25 RX ADMIN — Medication 2 MILLIGRAM(S): at 10:01

## 2017-10-25 RX ADMIN — Medication 1 PATCH: at 10:00

## 2017-10-25 RX ADMIN — POLYETHYLENE GLYCOL 3350 17 GRAM(S): 17 POWDER, FOR SOLUTION ORAL at 10:00

## 2017-10-25 RX ADMIN — SIMETHICONE 80 MILLIGRAM(S): 80 TABLET, CHEWABLE ORAL at 22:46

## 2017-10-25 RX ADMIN — GABAPENTIN 400 MILLIGRAM(S): 400 CAPSULE ORAL at 21:00

## 2017-10-25 RX ADMIN — HALOPERIDOL DECANOATE 5 MILLIGRAM(S): 100 INJECTION INTRAMUSCULAR at 11:49

## 2017-10-25 RX ADMIN — Medication 1 MILLIGRAM(S): at 10:00

## 2017-10-25 RX ADMIN — Medication 400 MILLIGRAM(S): at 06:38

## 2017-10-25 RX ADMIN — OXCARBAZEPINE 600 MILLIGRAM(S): 300 TABLET, FILM COATED ORAL at 21:00

## 2017-10-25 RX ADMIN — Medication 400 MILLIGRAM(S): at 22:46

## 2017-10-25 RX ADMIN — Medication 50 MILLIGRAM(S): at 01:59

## 2017-10-25 RX ADMIN — Medication 2 MILLIGRAM(S): at 11:48

## 2017-10-25 RX ADMIN — Medication 400 MILLIGRAM(S): at 00:30

## 2017-10-25 RX ADMIN — Medication 400 MILLIGRAM(S): at 01:44

## 2017-10-25 RX ADMIN — BUDESONIDE AND FORMOTEROL FUMARATE DIHYDRATE 2 PUFF(S): 160; 4.5 AEROSOL RESPIRATORY (INHALATION) at 21:01

## 2017-10-25 RX ADMIN — Medication 400 MILLIGRAM(S): at 13:26

## 2017-10-25 RX ADMIN — Medication 2 MILLIGRAM(S): at 03:00

## 2017-10-25 RX ADMIN — HALOPERIDOL DECANOATE 5 MILLIGRAM(S): 100 INJECTION INTRAMUSCULAR at 01:59

## 2017-10-25 RX ADMIN — GABAPENTIN 400 MILLIGRAM(S): 400 CAPSULE ORAL at 10:00

## 2017-10-25 RX ADMIN — FLUPHENAZINE HYDROCHLORIDE 5 MILLIGRAM(S): 1 TABLET, FILM COATED ORAL at 21:00

## 2017-10-26 PROCEDURE — 90853 GROUP PSYCHOTHERAPY: CPT

## 2017-10-26 RX ADMIN — GABAPENTIN 400 MILLIGRAM(S): 400 CAPSULE ORAL at 20:48

## 2017-10-26 RX ADMIN — FLUPHENAZINE HYDROCHLORIDE 5 MILLIGRAM(S): 1 TABLET, FILM COATED ORAL at 20:48

## 2017-10-26 RX ADMIN — GABAPENTIN 400 MILLIGRAM(S): 400 CAPSULE ORAL at 08:52

## 2017-10-26 RX ADMIN — OXCARBAZEPINE 600 MILLIGRAM(S): 300 TABLET, FILM COATED ORAL at 20:48

## 2017-10-26 RX ADMIN — BUDESONIDE AND FORMOTEROL FUMARATE DIHYDRATE 2 PUFF(S): 160; 4.5 AEROSOL RESPIRATORY (INHALATION) at 08:52

## 2017-10-26 RX ADMIN — BUDESONIDE AND FORMOTEROL FUMARATE DIHYDRATE 2 PUFF(S): 160; 4.5 AEROSOL RESPIRATORY (INHALATION) at 21:29

## 2017-10-26 RX ADMIN — OXCARBAZEPINE 600 MILLIGRAM(S): 300 TABLET, FILM COATED ORAL at 08:52

## 2017-10-26 RX ADMIN — Medication 1 EACH: at 20:30

## 2017-10-26 RX ADMIN — Medication 1 PATCH: at 08:52

## 2017-10-26 RX ADMIN — POLYETHYLENE GLYCOL 3350 17 GRAM(S): 17 POWDER, FOR SOLUTION ORAL at 08:52

## 2017-10-26 RX ADMIN — Medication 400 MILLIGRAM(S): at 06:32

## 2017-10-26 RX ADMIN — Medication 1 MILLIGRAM(S): at 09:03

## 2017-10-26 RX ADMIN — Medication 400 MILLIGRAM(S): at 23:18

## 2017-10-26 RX ADMIN — Medication 50 MILLIGRAM(S): at 23:19

## 2017-10-27 PROCEDURE — 99232 SBSQ HOSP IP/OBS MODERATE 35: CPT

## 2017-10-27 RX ORDER — GABAPENTIN 400 MG/1
300 CAPSULE ORAL THREE TIMES A DAY
Qty: 0 | Refills: 0 | Status: DISCONTINUED | OUTPATIENT
Start: 2017-10-27 | End: 2017-10-31

## 2017-10-27 RX ADMIN — BUDESONIDE AND FORMOTEROL FUMARATE DIHYDRATE 2 PUFF(S): 160; 4.5 AEROSOL RESPIRATORY (INHALATION) at 07:23

## 2017-10-27 RX ADMIN — SIMETHICONE 80 MILLIGRAM(S): 80 TABLET, CHEWABLE ORAL at 18:00

## 2017-10-27 RX ADMIN — Medication 400 MILLIGRAM(S): at 06:27

## 2017-10-27 RX ADMIN — Medication 1 MILLIGRAM(S): at 08:23

## 2017-10-27 RX ADMIN — OXCARBAZEPINE 600 MILLIGRAM(S): 300 TABLET, FILM COATED ORAL at 21:22

## 2017-10-27 RX ADMIN — Medication 1 PATCH: at 10:45

## 2017-10-27 RX ADMIN — Medication 650 MILLIGRAM(S): at 00:45

## 2017-10-27 RX ADMIN — GABAPENTIN 300 MILLIGRAM(S): 400 CAPSULE ORAL at 21:22

## 2017-10-27 RX ADMIN — GABAPENTIN 400 MILLIGRAM(S): 400 CAPSULE ORAL at 08:22

## 2017-10-27 RX ADMIN — Medication 400 MILLIGRAM(S): at 15:35

## 2017-10-27 RX ADMIN — POLYETHYLENE GLYCOL 3350 17 GRAM(S): 17 POWDER, FOR SOLUTION ORAL at 08:21

## 2017-10-27 RX ADMIN — Medication 50 MILLIGRAM(S): at 21:00

## 2017-10-27 RX ADMIN — HALOPERIDOL DECANOATE 5 MILLIGRAM(S): 100 INJECTION INTRAMUSCULAR at 21:00

## 2017-10-27 RX ADMIN — Medication 1 EACH: at 07:23

## 2017-10-27 RX ADMIN — Medication 2 MILLIGRAM(S): at 21:00

## 2017-10-27 RX ADMIN — GABAPENTIN 300 MILLIGRAM(S): 400 CAPSULE ORAL at 15:35

## 2017-10-27 RX ADMIN — Medication 30 MILLILITER(S): at 21:00

## 2017-10-27 RX ADMIN — BUDESONIDE AND FORMOTEROL FUMARATE DIHYDRATE 2 PUFF(S): 160; 4.5 AEROSOL RESPIRATORY (INHALATION) at 21:04

## 2017-10-27 RX ADMIN — FLUPHENAZINE HYDROCHLORIDE 5 MILLIGRAM(S): 1 TABLET, FILM COATED ORAL at 21:22

## 2017-10-27 RX ADMIN — Medication 2 MILLIGRAM(S): at 08:23

## 2017-10-27 RX ADMIN — OXCARBAZEPINE 600 MILLIGRAM(S): 300 TABLET, FILM COATED ORAL at 08:22

## 2017-10-27 RX ADMIN — SIMETHICONE 80 MILLIGRAM(S): 80 TABLET, CHEWABLE ORAL at 00:45

## 2017-10-27 RX ADMIN — SIMETHICONE 80 MILLIGRAM(S): 80 TABLET, CHEWABLE ORAL at 06:45

## 2017-10-28 RX ORDER — LIDOCAINE 4 G/100G
1 CREAM TOPICAL ONCE
Qty: 0 | Refills: 0 | Status: COMPLETED | OUTPATIENT
Start: 2017-10-28 | End: 2017-10-28

## 2017-10-28 RX ADMIN — GABAPENTIN 300 MILLIGRAM(S): 400 CAPSULE ORAL at 21:19

## 2017-10-28 RX ADMIN — BUDESONIDE AND FORMOTEROL FUMARATE DIHYDRATE 2 PUFF(S): 160; 4.5 AEROSOL RESPIRATORY (INHALATION) at 21:14

## 2017-10-28 RX ADMIN — Medication 650 MILLIGRAM(S): at 07:42

## 2017-10-28 RX ADMIN — OXCARBAZEPINE 600 MILLIGRAM(S): 300 TABLET, FILM COATED ORAL at 21:14

## 2017-10-28 RX ADMIN — LIDOCAINE 1 PATCH: 4 CREAM TOPICAL at 12:26

## 2017-10-28 RX ADMIN — BUDESONIDE AND FORMOTEROL FUMARATE DIHYDRATE 2 PUFF(S): 160; 4.5 AEROSOL RESPIRATORY (INHALATION) at 07:43

## 2017-10-28 RX ADMIN — Medication 2 MILLIGRAM(S): at 21:20

## 2017-10-28 RX ADMIN — Medication 400 MILLIGRAM(S): at 17:06

## 2017-10-28 RX ADMIN — Medication 400 MILLIGRAM(S): at 22:11

## 2017-10-28 RX ADMIN — Medication 1 PATCH: at 09:10

## 2017-10-28 RX ADMIN — FLUPHENAZINE HYDROCHLORIDE 5 MILLIGRAM(S): 1 TABLET, FILM COATED ORAL at 21:19

## 2017-10-28 RX ADMIN — Medication 1 EACH: at 07:43

## 2017-10-28 RX ADMIN — GABAPENTIN 300 MILLIGRAM(S): 400 CAPSULE ORAL at 13:10

## 2017-10-28 RX ADMIN — Medication 400 MILLIGRAM(S): at 05:15

## 2017-10-28 RX ADMIN — Medication 400 MILLIGRAM(S): at 12:25

## 2017-10-28 RX ADMIN — Medication 30 MILLILITER(S): at 18:36

## 2017-10-28 RX ADMIN — Medication 1 EACH: at 21:21

## 2017-10-28 RX ADMIN — Medication 50 MILLIGRAM(S): at 21:20

## 2017-10-28 RX ADMIN — Medication 30 MILLILITER(S): at 06:00

## 2017-10-28 RX ADMIN — OXCARBAZEPINE 600 MILLIGRAM(S): 300 TABLET, FILM COATED ORAL at 09:10

## 2017-10-28 RX ADMIN — Medication 400 MILLIGRAM(S): at 13:21

## 2017-10-28 RX ADMIN — Medication 400 MILLIGRAM(S): at 23:02

## 2017-10-28 RX ADMIN — Medication 1 MILLIGRAM(S): at 09:10

## 2017-10-29 RX ADMIN — SIMETHICONE 80 MILLIGRAM(S): 80 TABLET, CHEWABLE ORAL at 05:33

## 2017-10-29 RX ADMIN — Medication 650 MILLIGRAM(S): at 05:33

## 2017-10-29 RX ADMIN — GABAPENTIN 300 MILLIGRAM(S): 400 CAPSULE ORAL at 13:31

## 2017-10-29 RX ADMIN — Medication 1 PATCH: at 08:28

## 2017-10-29 RX ADMIN — Medication 400 MILLIGRAM(S): at 15:48

## 2017-10-29 RX ADMIN — BUDESONIDE AND FORMOTEROL FUMARATE DIHYDRATE 2 PUFF(S): 160; 4.5 AEROSOL RESPIRATORY (INHALATION) at 08:22

## 2017-10-29 RX ADMIN — OXCARBAZEPINE 600 MILLIGRAM(S): 300 TABLET, FILM COATED ORAL at 08:28

## 2017-10-29 RX ADMIN — FLUPHENAZINE HYDROCHLORIDE 5 MILLIGRAM(S): 1 TABLET, FILM COATED ORAL at 22:03

## 2017-10-29 RX ADMIN — Medication 30 MILLILITER(S): at 11:29

## 2017-10-29 RX ADMIN — LIDOCAINE 1 PATCH: 4 CREAM TOPICAL at 00:27

## 2017-10-29 RX ADMIN — Medication 650 MILLIGRAM(S): at 06:03

## 2017-10-29 RX ADMIN — Medication 1 MILLIGRAM(S): at 08:27

## 2017-10-29 RX ADMIN — POLYETHYLENE GLYCOL 3350 17 GRAM(S): 17 POWDER, FOR SOLUTION ORAL at 08:28

## 2017-10-29 RX ADMIN — Medication 1 EACH: at 21:14

## 2017-10-29 RX ADMIN — BUDESONIDE AND FORMOTEROL FUMARATE DIHYDRATE 2 PUFF(S): 160; 4.5 AEROSOL RESPIRATORY (INHALATION) at 21:14

## 2017-10-29 RX ADMIN — OXCARBAZEPINE 600 MILLIGRAM(S): 300 TABLET, FILM COATED ORAL at 22:03

## 2017-10-29 RX ADMIN — Medication 50 MILLIGRAM(S): at 08:28

## 2017-10-29 RX ADMIN — Medication 2 MILLIGRAM(S): at 08:28

## 2017-10-29 RX ADMIN — HALOPERIDOL DECANOATE 5 MILLIGRAM(S): 100 INJECTION INTRAMUSCULAR at 08:28

## 2017-10-29 RX ADMIN — GABAPENTIN 300 MILLIGRAM(S): 400 CAPSULE ORAL at 08:27

## 2017-10-29 RX ADMIN — GABAPENTIN 300 MILLIGRAM(S): 400 CAPSULE ORAL at 22:03

## 2017-10-30 PROCEDURE — 99232 SBSQ HOSP IP/OBS MODERATE 35: CPT

## 2017-10-30 RX ADMIN — Medication 650 MILLIGRAM(S): at 20:30

## 2017-10-30 RX ADMIN — Medication 1 PATCH: at 08:11

## 2017-10-30 RX ADMIN — Medication 1 MILLIGRAM(S): at 08:10

## 2017-10-30 RX ADMIN — BUDESONIDE AND FORMOTEROL FUMARATE DIHYDRATE 2 PUFF(S): 160; 4.5 AEROSOL RESPIRATORY (INHALATION) at 21:20

## 2017-10-30 RX ADMIN — GABAPENTIN 300 MILLIGRAM(S): 400 CAPSULE ORAL at 13:20

## 2017-10-30 RX ADMIN — FLUPHENAZINE HYDROCHLORIDE 5 MILLIGRAM(S): 1 TABLET, FILM COATED ORAL at 21:47

## 2017-10-30 RX ADMIN — OXCARBAZEPINE 600 MILLIGRAM(S): 300 TABLET, FILM COATED ORAL at 21:47

## 2017-10-30 RX ADMIN — GABAPENTIN 300 MILLIGRAM(S): 400 CAPSULE ORAL at 08:10

## 2017-10-30 RX ADMIN — GABAPENTIN 300 MILLIGRAM(S): 400 CAPSULE ORAL at 21:47

## 2017-10-30 RX ADMIN — Medication 400 MILLIGRAM(S): at 05:18

## 2017-10-30 RX ADMIN — Medication 650 MILLIGRAM(S): at 09:10

## 2017-10-30 RX ADMIN — Medication 400 MILLIGRAM(S): at 19:18

## 2017-10-30 RX ADMIN — OXCARBAZEPINE 600 MILLIGRAM(S): 300 TABLET, FILM COATED ORAL at 08:10

## 2017-10-30 RX ADMIN — Medication 400 MILLIGRAM(S): at 18:07

## 2017-10-30 RX ADMIN — Medication 650 MILLIGRAM(S): at 21:32

## 2017-10-30 RX ADMIN — Medication 650 MILLIGRAM(S): at 08:10

## 2017-10-30 RX ADMIN — BUDESONIDE AND FORMOTEROL FUMARATE DIHYDRATE 2 PUFF(S): 160; 4.5 AEROSOL RESPIRATORY (INHALATION) at 08:10

## 2017-10-30 RX ADMIN — POLYETHYLENE GLYCOL 3350 17 GRAM(S): 17 POWDER, FOR SOLUTION ORAL at 08:11

## 2017-10-30 RX ADMIN — Medication 2 MILLIGRAM(S): at 22:08

## 2017-10-31 PROCEDURE — 99232 SBSQ HOSP IP/OBS MODERATE 35: CPT | Mod: 25

## 2017-10-31 RX ORDER — GABAPENTIN 400 MG/1
400 CAPSULE ORAL THREE TIMES A DAY
Qty: 0 | Refills: 0 | Status: DISCONTINUED | OUTPATIENT
Start: 2017-10-31 | End: 2017-11-01

## 2017-10-31 RX ADMIN — Medication 400 MILLIGRAM(S): at 17:00

## 2017-10-31 RX ADMIN — GABAPENTIN 400 MILLIGRAM(S): 400 CAPSULE ORAL at 21:29

## 2017-10-31 RX ADMIN — Medication 650 MILLIGRAM(S): at 21:41

## 2017-10-31 RX ADMIN — Medication 400 MILLIGRAM(S): at 03:30

## 2017-10-31 RX ADMIN — OXCARBAZEPINE 600 MILLIGRAM(S): 300 TABLET, FILM COATED ORAL at 08:08

## 2017-10-31 RX ADMIN — Medication 1 MILLIGRAM(S): at 08:08

## 2017-10-31 RX ADMIN — FLUPHENAZINE HYDROCHLORIDE 5 MILLIGRAM(S): 1 TABLET, FILM COATED ORAL at 21:30

## 2017-10-31 RX ADMIN — Medication 1 PATCH: at 08:08

## 2017-10-31 RX ADMIN — Medication 50 MILLIGRAM(S): at 16:59

## 2017-10-31 RX ADMIN — BUDESONIDE AND FORMOTEROL FUMARATE DIHYDRATE 2 PUFF(S): 160; 4.5 AEROSOL RESPIRATORY (INHALATION) at 09:25

## 2017-10-31 RX ADMIN — Medication 650 MILLIGRAM(S): at 08:43

## 2017-10-31 RX ADMIN — Medication 650 MILLIGRAM(S): at 07:43

## 2017-10-31 RX ADMIN — GABAPENTIN 300 MILLIGRAM(S): 400 CAPSULE ORAL at 08:08

## 2017-10-31 RX ADMIN — OXCARBAZEPINE 600 MILLIGRAM(S): 300 TABLET, FILM COATED ORAL at 21:29

## 2017-10-31 RX ADMIN — BUDESONIDE AND FORMOTEROL FUMARATE DIHYDRATE 2 PUFF(S): 160; 4.5 AEROSOL RESPIRATORY (INHALATION) at 21:33

## 2017-10-31 RX ADMIN — Medication 50 MILLIGRAM(S): at 03:30

## 2017-10-31 RX ADMIN — Medication 2 MILLIGRAM(S): at 17:00

## 2017-10-31 RX ADMIN — POLYETHYLENE GLYCOL 3350 17 GRAM(S): 17 POWDER, FOR SOLUTION ORAL at 08:08

## 2017-10-31 RX ADMIN — GABAPENTIN 400 MILLIGRAM(S): 400 CAPSULE ORAL at 12:44

## 2017-11-01 ENCOUNTER — OUTPATIENT (OUTPATIENT)
Dept: OUTPATIENT SERVICES | Facility: HOSPITAL | Age: 27
LOS: 1 days | Discharge: HOME | End: 2017-11-01

## 2017-11-01 ENCOUNTER — OUTPATIENT (OUTPATIENT)
Dept: OUTPATIENT SERVICES | Facility: HOSPITAL | Age: 27
LOS: 1 days | End: 2017-11-01
Payer: MEDICAID

## 2017-11-01 VITALS — TEMPERATURE: 97 F

## 2017-11-01 PROCEDURE — 99238 HOSP IP/OBS DSCHRG MGMT 30/<: CPT

## 2017-11-01 RX ORDER — POLYETHYLENE GLYCOL 3350 17 G/17G
17 POWDER, FOR SOLUTION ORAL
Qty: 1 | Refills: 0 | OUTPATIENT
Start: 2017-11-01

## 2017-11-01 RX ORDER — OXCARBAZEPINE 300 MG/1
1 TABLET, FILM COATED ORAL
Qty: 60 | Refills: 0 | OUTPATIENT
Start: 2017-11-01 | End: 2017-12-01

## 2017-11-01 RX ORDER — POLYETHYLENE GLYCOL 3350 17 G/17G
17 POWDER, FOR SOLUTION ORAL
Qty: 0 | Refills: 0 | COMMUNITY
Start: 2017-11-01

## 2017-11-01 RX ORDER — FLUPHENAZINE HYDROCHLORIDE 1 MG/1
1 TABLET, FILM COATED ORAL
Qty: 30 | Refills: 0 | OUTPATIENT
Start: 2017-11-01 | End: 2017-12-01

## 2017-11-01 RX ORDER — GABAPENTIN 400 MG/1
1 CAPSULE ORAL
Qty: 90 | Refills: 0 | OUTPATIENT
Start: 2017-11-01 | End: 2017-12-01

## 2017-11-01 RX ORDER — BENZTROPINE MESYLATE 1 MG
1 TABLET ORAL
Qty: 30 | Refills: 0 | OUTPATIENT
Start: 2017-11-01 | End: 2017-12-01

## 2017-11-01 RX ORDER — NICOTINE POLACRILEX 2 MG
1 GUM BUCCAL
Qty: 1 | Refills: 0 | OUTPATIENT
Start: 2017-11-01

## 2017-11-01 RX ORDER — BUDESONIDE AND FORMOTEROL FUMARATE DIHYDRATE 160; 4.5 UG/1; UG/1
2 AEROSOL RESPIRATORY (INHALATION)
Qty: 1 | Refills: 0 | OUTPATIENT
Start: 2017-11-01

## 2017-11-01 RX ADMIN — GABAPENTIN 400 MILLIGRAM(S): 400 CAPSULE ORAL at 07:33

## 2017-11-01 RX ADMIN — Medication 2 MILLIGRAM(S): at 10:10

## 2017-11-01 RX ADMIN — Medication 650 MILLIGRAM(S): at 05:30

## 2017-11-01 RX ADMIN — OXCARBAZEPINE 600 MILLIGRAM(S): 300 TABLET, FILM COATED ORAL at 07:33

## 2017-11-01 RX ADMIN — Medication 400 MILLIGRAM(S): at 10:10

## 2017-11-01 RX ADMIN — Medication 400 MILLIGRAM(S): at 16:19

## 2017-11-01 RX ADMIN — Medication 650 MILLIGRAM(S): at 04:20

## 2017-11-01 RX ADMIN — Medication 1 PATCH: at 16:19

## 2017-11-01 RX ADMIN — Medication 1 PATCH: at 07:33

## 2017-11-01 RX ADMIN — POLYETHYLENE GLYCOL 3350 17 GRAM(S): 17 POWDER, FOR SOLUTION ORAL at 07:33

## 2017-11-01 RX ADMIN — Medication 1 MILLIGRAM(S): at 07:33

## 2017-11-02 ENCOUNTER — OUTPATIENT (OUTPATIENT)
Dept: OUTPATIENT SERVICES | Facility: HOSPITAL | Age: 27
LOS: 1 days | Discharge: HOME | End: 2017-11-02

## 2017-11-02 DIAGNOSIS — R69 ILLNESS, UNSPECIFIED: ICD-10-CM

## 2017-11-03 DIAGNOSIS — F31.9 BIPOLAR DISORDER, UNSPECIFIED: ICD-10-CM

## 2017-11-08 ENCOUNTER — APPOINTMENT (OUTPATIENT)
Dept: INTERNAL MEDICINE | Facility: CLINIC | Age: 27
End: 2017-11-08
Payer: MEDICAID

## 2017-11-08 VITALS
BODY MASS INDEX: 30.36 KG/M2 | HEART RATE: 94 BPM | HEIGHT: 69 IN | DIASTOLIC BLOOD PRESSURE: 60 MMHG | SYSTOLIC BLOOD PRESSURE: 100 MMHG | RESPIRATION RATE: 16 BRPM | TEMPERATURE: 98.9 F | OXYGEN SATURATION: 98 % | WEIGHT: 205 LBS

## 2017-11-08 DIAGNOSIS — F31.9 BIPOLAR DISORDER, UNSPECIFIED: ICD-10-CM

## 2017-11-08 DIAGNOSIS — K21.9 GASTRO-ESOPHAGEAL REFLUX DISEASE W/OUT ESOPHAGITIS: ICD-10-CM

## 2017-11-08 DIAGNOSIS — G89.29 LOW BACK PAIN: ICD-10-CM

## 2017-11-08 DIAGNOSIS — M54.5 LOW BACK PAIN: ICD-10-CM

## 2017-11-08 DIAGNOSIS — R39.11 HESITANCY OF MICTURITION: ICD-10-CM

## 2017-11-08 DIAGNOSIS — R11.0 NAUSEA: ICD-10-CM

## 2017-11-08 DIAGNOSIS — J30.9 ALLERGIC RHINITIS, UNSPECIFIED: ICD-10-CM

## 2017-11-08 DIAGNOSIS — Z00.00 ENCOUNTER FOR GENERAL ADULT MEDICAL EXAMINATION W/OUT ABNORMAL FINDINGS: ICD-10-CM

## 2017-11-08 PROCEDURE — 99215 OFFICE O/P EST HI 40 MIN: CPT

## 2017-11-08 RX ORDER — ONDANSETRON HYDROCHLORIDE 4 MG/1
4 TABLET, FILM COATED ORAL
Qty: 1 | Refills: 0 | Status: ACTIVE | COMMUNITY
Start: 2017-11-08 | End: 1900-01-01

## 2017-11-09 RX ORDER — ARIPIPRAZOLE 300 MG
KIT INTRAMUSCULAR
Refills: 0 | Status: ACTIVE | COMMUNITY

## 2017-11-09 RX ORDER — FENOFIBRATE 145 MG/1
TABLET ORAL
Refills: 0 | Status: ACTIVE | COMMUNITY

## 2017-11-09 RX ORDER — FLUPHENAZINE ENANTHATE 25 MG/ML
VIAL (ML) INJECTION
Refills: 0 | Status: ACTIVE | COMMUNITY

## 2017-11-27 ENCOUNTER — INPATIENT (INPATIENT)
Facility: HOSPITAL | Age: 27
LOS: 8 days | Discharge: HOME | End: 2017-12-06
Attending: PSYCHIATRY & NEUROLOGY | Admitting: PSYCHIATRY & NEUROLOGY
Payer: MEDICAID

## 2017-11-27 VITALS
DIASTOLIC BLOOD PRESSURE: 116 MMHG | RESPIRATION RATE: 16 BRPM | TEMPERATURE: 99 F | OXYGEN SATURATION: 100 % | HEART RATE: 130 BPM | SYSTOLIC BLOOD PRESSURE: 156 MMHG

## 2017-11-27 DIAGNOSIS — F31.9 BIPOLAR DISORDER, UNSPECIFIED: ICD-10-CM

## 2017-11-27 LAB
ALBUMIN SERPL ELPH-MCNC: 3.9 G/DL — SIGNIFICANT CHANGE UP (ref 3.3–5)
ALP SERPL-CCNC: 63 U/L — SIGNIFICANT CHANGE UP (ref 40–120)
ALT FLD-CCNC: 13 U/L — SIGNIFICANT CHANGE UP (ref 4–41)
AMPHET UR-MCNC: NEGATIVE — SIGNIFICANT CHANGE UP
APAP SERPL-MCNC: < 15 UG/ML — LOW (ref 15–25)
APPEARANCE UR: CLEAR — SIGNIFICANT CHANGE UP
AST SERPL-CCNC: 23 U/L — SIGNIFICANT CHANGE UP (ref 4–40)
BACTERIA # UR AUTO: SIGNIFICANT CHANGE UP
BARBITURATES MEASUREMENT: NEGATIVE — SIGNIFICANT CHANGE UP
BARBITURATES UR SCN-MCNC: NEGATIVE — SIGNIFICANT CHANGE UP
BASOPHILS # BLD AUTO: 0.03 K/UL — SIGNIFICANT CHANGE UP (ref 0–0.2)
BASOPHILS NFR BLD AUTO: 0.3 % — SIGNIFICANT CHANGE UP (ref 0–2)
BENZODIAZ SERPL-MCNC: NEGATIVE — SIGNIFICANT CHANGE UP
BENZODIAZ UR-MCNC: POSITIVE — SIGNIFICANT CHANGE UP
BILIRUB SERPL-MCNC: < 0.2 MG/DL — LOW (ref 0.2–1.2)
BILIRUB UR-MCNC: NEGATIVE — SIGNIFICANT CHANGE UP
BLOOD UR QL VISUAL: NEGATIVE — SIGNIFICANT CHANGE UP
BUN SERPL-MCNC: 18 MG/DL — SIGNIFICANT CHANGE UP (ref 7–23)
CALCIUM SERPL-MCNC: 9 MG/DL — SIGNIFICANT CHANGE UP (ref 8.4–10.5)
CANNABINOIDS UR-MCNC: POSITIVE — SIGNIFICANT CHANGE UP
CHLORIDE SERPL-SCNC: 105 MMOL/L — SIGNIFICANT CHANGE UP (ref 98–107)
CO2 SERPL-SCNC: 22 MMOL/L — SIGNIFICANT CHANGE UP (ref 22–31)
COCAINE METAB.OTHER UR-MCNC: NEGATIVE — SIGNIFICANT CHANGE UP
COLOR SPEC: YELLOW — SIGNIFICANT CHANGE UP
CREAT SERPL-MCNC: 1.08 MG/DL — SIGNIFICANT CHANGE UP (ref 0.5–1.3)
EOSINOPHIL # BLD AUTO: 0.05 K/UL — SIGNIFICANT CHANGE UP (ref 0–0.5)
EOSINOPHIL NFR BLD AUTO: 0.4 % — SIGNIFICANT CHANGE UP (ref 0–6)
ETHANOL BLD-MCNC: < 10 MG/DL — SIGNIFICANT CHANGE UP
GLUCOSE SERPL-MCNC: 109 MG/DL — HIGH (ref 70–99)
GLUCOSE UR-MCNC: NEGATIVE — SIGNIFICANT CHANGE UP
HCT VFR BLD CALC: 44.3 % — SIGNIFICANT CHANGE UP (ref 39–50)
HGB BLD-MCNC: 15 G/DL — SIGNIFICANT CHANGE UP (ref 13–17)
IMM GRANULOCYTES # BLD AUTO: 0.05 # — SIGNIFICANT CHANGE UP
IMM GRANULOCYTES NFR BLD AUTO: 0.4 % — SIGNIFICANT CHANGE UP (ref 0–1.5)
KETONES UR-MCNC: NEGATIVE — SIGNIFICANT CHANGE UP
LEUKOCYTE ESTERASE UR-ACNC: NEGATIVE — SIGNIFICANT CHANGE UP
LYMPHOCYTES # BLD AUTO: 17.6 % — SIGNIFICANT CHANGE UP (ref 13–44)
LYMPHOCYTES # BLD AUTO: 2.1 K/UL — SIGNIFICANT CHANGE UP (ref 1–3.3)
MCHC RBC-ENTMCNC: 30.7 PG — SIGNIFICANT CHANGE UP (ref 27–34)
MCHC RBC-ENTMCNC: 33.9 % — SIGNIFICANT CHANGE UP (ref 32–36)
MCV RBC AUTO: 90.8 FL — SIGNIFICANT CHANGE UP (ref 80–100)
METHADONE UR-MCNC: NEGATIVE — SIGNIFICANT CHANGE UP
MONOCYTES # BLD AUTO: 0.7 K/UL — SIGNIFICANT CHANGE UP (ref 0–0.9)
MONOCYTES NFR BLD AUTO: 5.9 % — SIGNIFICANT CHANGE UP (ref 2–14)
MUCOUS THREADS # UR AUTO: SIGNIFICANT CHANGE UP
NEUTROPHILS # BLD AUTO: 9 K/UL — HIGH (ref 1.8–7.4)
NEUTROPHILS NFR BLD AUTO: 75.4 % — SIGNIFICANT CHANGE UP (ref 43–77)
NITRITE UR-MCNC: NEGATIVE — SIGNIFICANT CHANGE UP
NRBC # FLD: 0 — SIGNIFICANT CHANGE UP
OPIATES UR-MCNC: NEGATIVE — SIGNIFICANT CHANGE UP
OXYCODONE UR-MCNC: NEGATIVE — SIGNIFICANT CHANGE UP
PCP UR-MCNC: NEGATIVE — SIGNIFICANT CHANGE UP
PH UR: 6.5 — SIGNIFICANT CHANGE UP (ref 4.6–8)
PLATELET # BLD AUTO: 231 K/UL — SIGNIFICANT CHANGE UP (ref 150–400)
PMV BLD: 9.4 FL — SIGNIFICANT CHANGE UP (ref 7–13)
POTASSIUM SERPL-MCNC: 4 MMOL/L — SIGNIFICANT CHANGE UP (ref 3.5–5.3)
POTASSIUM SERPL-SCNC: 4 MMOL/L — SIGNIFICANT CHANGE UP (ref 3.5–5.3)
PROT SERPL-MCNC: 6.3 G/DL — SIGNIFICANT CHANGE UP (ref 6–8.3)
PROT UR-MCNC: 20 — SIGNIFICANT CHANGE UP
RBC # BLD: 4.88 M/UL — SIGNIFICANT CHANGE UP (ref 4.2–5.8)
RBC # FLD: 12.9 % — SIGNIFICANT CHANGE UP (ref 10.3–14.5)
RBC CASTS # UR COMP ASSIST: SIGNIFICANT CHANGE UP (ref 0–?)
SALICYLATES SERPL-MCNC: < 5 MG/DL — LOW (ref 15–30)
SODIUM SERPL-SCNC: 142 MMOL/L — SIGNIFICANT CHANGE UP (ref 135–145)
SP GR SPEC: 1.02 — SIGNIFICANT CHANGE UP (ref 1–1.03)
TSH SERPL-MCNC: 1.04 UIU/ML — SIGNIFICANT CHANGE UP (ref 0.27–4.2)
UROBILINOGEN FLD QL: NORMAL E.U. — SIGNIFICANT CHANGE UP (ref 0.1–0.2)
WBC # BLD: 11.93 K/UL — HIGH (ref 3.8–10.5)
WBC # FLD AUTO: 11.93 K/UL — HIGH (ref 3.8–10.5)
WBC UR QL: SIGNIFICANT CHANGE UP (ref 0–?)

## 2017-11-27 PROCEDURE — 99285 EMERGENCY DEPT VISIT HI MDM: CPT | Mod: GC

## 2017-11-27 RX ORDER — BUDESONIDE AND FORMOTEROL FUMARATE DIHYDRATE 160; 4.5 UG/1; UG/1
2 AEROSOL RESPIRATORY (INHALATION)
Qty: 0 | Refills: 0 | Status: DISCONTINUED | OUTPATIENT
Start: 2017-11-27 | End: 2017-11-30

## 2017-11-27 RX ORDER — OXCARBAZEPINE 300 MG/1
900 TABLET, FILM COATED ORAL AT BEDTIME
Qty: 0 | Refills: 0 | Status: DISCONTINUED | OUTPATIENT
Start: 2017-11-27 | End: 2017-12-06

## 2017-11-27 RX ORDER — GABAPENTIN 400 MG/1
600 CAPSULE ORAL
Qty: 0 | Refills: 0 | Status: DISCONTINUED | OUTPATIENT
Start: 2017-11-27 | End: 2017-11-29

## 2017-11-27 RX ORDER — DIPHENHYDRAMINE HCL 50 MG
50 CAPSULE ORAL EVERY 6 HOURS
Qty: 0 | Refills: 0 | Status: DISCONTINUED | OUTPATIENT
Start: 2017-11-27 | End: 2017-12-06

## 2017-11-27 RX ORDER — BENZTROPINE MESYLATE 1 MG
1.5 TABLET ORAL DAILY
Qty: 0 | Refills: 0 | Status: DISCONTINUED | OUTPATIENT
Start: 2017-11-27 | End: 2017-12-06

## 2017-11-27 RX ORDER — OXCARBAZEPINE 300 MG/1
600 TABLET, FILM COATED ORAL DAILY
Qty: 0 | Refills: 0 | Status: DISCONTINUED | OUTPATIENT
Start: 2017-11-27 | End: 2017-12-06

## 2017-11-27 RX ORDER — DIPHENHYDRAMINE HCL 50 MG
50 CAPSULE ORAL ONCE
Qty: 0 | Refills: 0 | Status: DISCONTINUED | OUTPATIENT
Start: 2017-11-27 | End: 2017-12-06

## 2017-11-27 RX ORDER — HALOPERIDOL DECANOATE 100 MG/ML
5 INJECTION INTRAMUSCULAR EVERY 6 HOURS
Qty: 0 | Refills: 0 | Status: DISCONTINUED | OUTPATIENT
Start: 2017-11-27 | End: 2017-12-06

## 2017-11-27 RX ORDER — ALBUTEROL 90 UG/1
2 AEROSOL, METERED ORAL EVERY 6 HOURS
Qty: 0 | Refills: 0 | Status: DISCONTINUED | OUTPATIENT
Start: 2017-11-27 | End: 2017-12-06

## 2017-11-27 RX ORDER — HALOPERIDOL DECANOATE 100 MG/ML
5 INJECTION INTRAMUSCULAR ONCE
Qty: 0 | Refills: 0 | Status: DISCONTINUED | OUTPATIENT
Start: 2017-11-27 | End: 2017-12-06

## 2017-11-27 RX ORDER — NICOTINE POLACRILEX 2 MG
4 GUM BUCCAL
Qty: 0 | Refills: 0 | Status: DISCONTINUED | OUTPATIENT
Start: 2017-11-27 | End: 2017-12-06

## 2017-11-27 RX ORDER — FLUPHENAZINE HYDROCHLORIDE 1 MG/1
10 TABLET, FILM COATED ORAL AT BEDTIME
Qty: 0 | Refills: 0 | Status: DISCONTINUED | OUTPATIENT
Start: 2017-11-27 | End: 2017-12-06

## 2017-11-27 NOTE — ED BEHAVIORAL HEALTH ASSESSMENT NOTE - MEDICAL ISSUES AND PLAN (INCLUDE STANDING AND PRN MEDICATION)
body pain: home meds (Oxtellar XR 600mg bid, Neurontin 100mg bid) body pain/neuropathic pain: Neurontin 600mg BID, Asthma: Symbicort daily, Albuterol prn

## 2017-11-27 NOTE — ED PROVIDER NOTE - MEDICAL DECISION MAKING DETAILS
This is a 27 year old Male PMHX Bipolar, Mood Disorder and psychosis BIBIA from for evaluation Per Triage note, pt was agitated and upset. versed 10mg given by EMS. Medical evaluation performed. There is no clinical evidence of intoxication or any acute medical problem requiring immediate intervention. Final disposition will be determined by psychiatrist.

## 2017-11-27 NOTE — ED PROVIDER NOTE - CARE PLAN
Principal Discharge DX:	Bipolar disorder with psychotic features  Secondary Diagnosis:	Mood disorder

## 2017-11-27 NOTE — ED BEHAVIORAL HEALTH ASSESSMENT NOTE - OTHER PAST PSYCHIATRIC HISTORY (INCLUDE DETAILS REGARDING ONSET, COURSE OF ILLNESS, INPATIENT/OUTPATIENT TREATMENT)
Dx w/ Bipolar I disorder with psychotic features, many prior inpt admissions, currently in outpt treatment at [facility], hx of tx with ECT

## 2017-11-27 NOTE — ED BEHAVIORAL HEALTH ASSESSMENT NOTE - PAST PSYCHOTROPIC MEDICATION
Per chart review, Lamictal - rash Latuda- more suicidal, Zyprexa- severe sedation, Seroquel - weight gain sedation Lithium- nausea/vomiting, Depakote- rash ?  Celexa- unclear why stopped, Wellbutrin-ineffective
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
## 2017-11-27 NOTE — ED BEHAVIORAL HEALTH ASSESSMENT NOTE - CURRENT MEDICATION
Abilify Maintena 300mg IM qMonthly (last given 11/6), Prolixin 10mg qHS, Oxtellar XR 600mg qAM + 900mg qHS, Neurontin 600mg BID, Cogentin 1.5mg daily

## 2017-11-27 NOTE — ED BEHAVIORAL HEALTH ASSESSMENT NOTE - OTHER
substance use substance psychiatrist, Dr. Lloyd ([phone]) CVM ranging from irritable/angry to tearful to laughing/smiling
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
## 2017-11-27 NOTE — ED BEHAVIORAL HEALTH ASSESSMENT NOTE - PSYCHIATRIC ISSUES AND PLAN (INCLUDE STANDING AND PRN MEDICATION)
psychosis & nancy: Abilify 300mg IM due 12/4, continue home meds Prolixin 10mg qHS, Oxtellar XR 600mg qAM + 900mg qHS (f/u blood level), Neurontin 600mg BID, Cogentin 1.5mg daily psychosis & nancy: Abilify 300mg IM due 12/4, continue home meds Prolixin 10mg qHS, Oxtellar XR 600mg qAM + 900mg qHS (f/u blood level), Neurontin 600mg BID, Cogentin 1.5mg daily. PRNs: Haldol 5mg PO/IM, Ativan 2mg PO/IM, Benadryl 50mg PO/IM q6h prn agitation

## 2017-11-27 NOTE — ED BEHAVIORAL HEALTH ASSESSMENT NOTE - SUICIDE RISK FACTORS
Substance abuse/dependence/Agitation/severe anxiety/Chronic pain or acute medical issue/Mood episode/Highly impulsive behavior

## 2017-11-27 NOTE — ED BEHAVIORAL HEALTH ASSESSMENT NOTE - DESCRIPTION
Pt sleeping s/p Versed 10mg in the ambulance     Vital Signs Last 24 Hrs  T(C): 37 (27 Nov 2017 11:34), Max: 37 (27 Nov 2017 11:34)  T(F): 98.6 (27 Nov 2017 11:34), Max: 98.6 (27 Nov 2017 11:34)  HR: 115 (27 Nov 2017 12:09) (115 - 130)  BP: 114/71 (27 Nov 2017 12:09) (114/71 - 156/116)  BP(mean): --  RR: 16 (27 Nov 2017 12:09) (16 - 16)  SpO2: 99% (27 Nov 2017 12:09) (99% - 100%) [employer] graduate from [employer], living w/ parents leg pain, asthma Pt sleeping s/p Versed 10mg in the ambulance. Later he is calm but irritable.     Vital Signs Last 24 Hrs  T(C): 37 (27 Nov 2017 11:34), Max: 37 (27 Nov 2017 11:34)  T(F): 98.6 (27 Nov 2017 11:34), Max: 98.6 (27 Nov 2017 11:34)  HR: 115 (27 Nov 2017 12:09) (115 - 130)  BP: 114/71 (27 Nov 2017 12:09) (114/71 - 156/116)  BP(mean): --  RR: 16 (27 Nov 2017 12:09) (16 - 16)  SpO2: 99% (27 Nov 2017 12:09) (99% - 100%)

## 2017-11-27 NOTE — ED BEHAVIORAL HEALTH ASSESSMENT NOTE - DESCRIPTION (FIRST USE, LAST USE, QUANTITY, FREQUENCY, DURATION)
mother suspects use mother and outpt psych report use, documented hx of use, possible K2 in the past
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
## 2017-11-27 NOTE — ED ADULT NURSE NOTE - CHIEF COMPLAINT QUOTE
from [facility] [place], sent in by psychiatrist bc pt was agitated and upset. versed 10mg given by EMS. pt calm at this time,

## 2017-11-27 NOTE — ED PROVIDER NOTE - OBJECTIVE STATEMENT
This is a 27 year old Male PMHX Bipolar, Mood Disorder and psychosis BIBIA from for evaluation Per Triage note, pt was agitated and upset. versed 10mg given by EMS. Patient is currently sleeping abd lethargic secondary to sedation This is a 27 year old Male PMHX Bipolar, Mood Disorder and psychosis BIBIA from for evaluation Per Triage note, pt was agitated and upset. versed 10mg given by EMS. Patient is currently sleeping and lethargic secondary to sedation

## 2017-11-27 NOTE — ED PROVIDER NOTE - PROGRESS NOTE DETAILS
Patient is awake and alert Denies chest pain, SOB, N/V/D and fevers, Denies palpitations or diaphoresis. Denies Numbness, Tingling, Blurry Vision and HA.   Denies recent falls, trauma and injuries. Denies pain or any other medical complaints.

## 2017-11-27 NOTE — ED ADULT TRIAGE NOTE - CHIEF COMPLAINT QUOTE
from [facility] [place], sent in by psychiatrist bc pt was agitated and upset. versed 10mg given by EMS. pt calm at this time,

## 2017-11-28 PROCEDURE — 99223 1ST HOSP IP/OBS HIGH 75: CPT

## 2017-11-28 RX ORDER — NICOTINE POLACRILEX 2 MG
1 GUM BUCCAL EVERY 6 HOURS
Qty: 0 | Refills: 0 | Status: DISCONTINUED | OUTPATIENT
Start: 2017-11-28 | End: 2017-12-06

## 2017-11-28 RX ADMIN — Medication 1.5 MILLIGRAM(S): at 07:01

## 2017-11-28 RX ADMIN — BUDESONIDE AND FORMOTEROL FUMARATE DIHYDRATE 2 PUFF(S): 160; 4.5 AEROSOL RESPIRATORY (INHALATION) at 07:01

## 2017-11-28 RX ADMIN — GABAPENTIN 600 MILLIGRAM(S): 400 CAPSULE ORAL at 07:01

## 2017-11-28 RX ADMIN — GABAPENTIN 600 MILLIGRAM(S): 400 CAPSULE ORAL at 21:16

## 2017-11-28 RX ADMIN — OXCARBAZEPINE 900 MILLIGRAM(S): 300 TABLET, FILM COATED ORAL at 21:16

## 2017-11-28 RX ADMIN — FLUPHENAZINE HYDROCHLORIDE 10 MILLIGRAM(S): 1 TABLET, FILM COATED ORAL at 21:16

## 2017-11-28 RX ADMIN — BUDESONIDE AND FORMOTEROL FUMARATE DIHYDRATE 2 PUFF(S): 160; 4.5 AEROSOL RESPIRATORY (INHALATION) at 21:16

## 2017-11-28 RX ADMIN — OXCARBAZEPINE 600 MILLIGRAM(S): 300 TABLET, FILM COATED ORAL at 09:13

## 2017-11-28 RX ADMIN — Medication 1 EACH: at 12:34

## 2017-11-29 PROCEDURE — 99232 SBSQ HOSP IP/OBS MODERATE 35: CPT

## 2017-11-29 RX ORDER — ACETAMINOPHEN 500 MG
650 TABLET ORAL ONCE
Qty: 0 | Refills: 0 | Status: COMPLETED | OUTPATIENT
Start: 2017-11-29 | End: 2017-11-29

## 2017-11-29 RX ORDER — IBUPROFEN 200 MG
600 TABLET ORAL EVERY 8 HOURS
Qty: 0 | Refills: 0 | Status: DISCONTINUED | OUTPATIENT
Start: 2017-11-29 | End: 2017-12-06

## 2017-11-29 RX ORDER — GABAPENTIN 400 MG/1
800 CAPSULE ORAL
Qty: 0 | Refills: 0 | Status: DISCONTINUED | OUTPATIENT
Start: 2017-11-29 | End: 2017-12-06

## 2017-11-29 RX ADMIN — Medication 650 MILLIGRAM(S): at 05:45

## 2017-11-29 RX ADMIN — Medication 600 MILLIGRAM(S): at 17:45

## 2017-11-29 RX ADMIN — GABAPENTIN 600 MILLIGRAM(S): 400 CAPSULE ORAL at 08:39

## 2017-11-29 RX ADMIN — Medication 650 MILLIGRAM(S): at 06:14

## 2017-11-29 RX ADMIN — Medication 600 MILLIGRAM(S): at 18:16

## 2017-11-29 RX ADMIN — BUDESONIDE AND FORMOTEROL FUMARATE DIHYDRATE 2 PUFF(S): 160; 4.5 AEROSOL RESPIRATORY (INHALATION) at 08:39

## 2017-11-29 RX ADMIN — Medication 1 EACH: at 15:27

## 2017-11-29 RX ADMIN — Medication 1.5 MILLIGRAM(S): at 08:39

## 2017-11-29 RX ADMIN — OXCARBAZEPINE 600 MILLIGRAM(S): 300 TABLET, FILM COATED ORAL at 08:39

## 2017-11-30 PROCEDURE — 90853 GROUP PSYCHOTHERAPY: CPT

## 2017-11-30 PROCEDURE — 99223 1ST HOSP IP/OBS HIGH 75: CPT

## 2017-11-30 PROCEDURE — 99232 SBSQ HOSP IP/OBS MODERATE 35: CPT | Mod: 25

## 2017-11-30 RX ORDER — BUDESONIDE AND FORMOTEROL FUMARATE DIHYDRATE 160; 4.5 UG/1; UG/1
2 AEROSOL RESPIRATORY (INHALATION)
Qty: 0 | Refills: 0 | Status: DISCONTINUED | OUTPATIENT
Start: 2017-11-30 | End: 2017-12-06

## 2017-11-30 RX ORDER — LIDOCAINE 4 G/100G
1 CREAM TOPICAL DAILY
Qty: 0 | Refills: 0 | Status: DISCONTINUED | OUTPATIENT
Start: 2017-11-30 | End: 2017-12-06

## 2017-11-30 RX ADMIN — OXCARBAZEPINE 900 MILLIGRAM(S): 300 TABLET, FILM COATED ORAL at 21:58

## 2017-11-30 RX ADMIN — Medication 600 MILLIGRAM(S): at 23:03

## 2017-11-30 RX ADMIN — GABAPENTIN 800 MILLIGRAM(S): 400 CAPSULE ORAL at 02:10

## 2017-11-30 RX ADMIN — OXCARBAZEPINE 600 MILLIGRAM(S): 300 TABLET, FILM COATED ORAL at 08:44

## 2017-11-30 RX ADMIN — Medication 4 MILLIGRAM(S): at 18:37

## 2017-11-30 RX ADMIN — BUDESONIDE AND FORMOTEROL FUMARATE DIHYDRATE 2 PUFF(S): 160; 4.5 AEROSOL RESPIRATORY (INHALATION) at 02:11

## 2017-11-30 RX ADMIN — Medication 1 EACH: at 10:36

## 2017-11-30 RX ADMIN — GABAPENTIN 800 MILLIGRAM(S): 400 CAPSULE ORAL at 21:57

## 2017-11-30 RX ADMIN — OXCARBAZEPINE 900 MILLIGRAM(S): 300 TABLET, FILM COATED ORAL at 02:10

## 2017-11-30 RX ADMIN — FLUPHENAZINE HYDROCHLORIDE 10 MILLIGRAM(S): 1 TABLET, FILM COATED ORAL at 21:57

## 2017-11-30 RX ADMIN — BUDESONIDE AND FORMOTEROL FUMARATE DIHYDRATE 2 PUFF(S): 160; 4.5 AEROSOL RESPIRATORY (INHALATION) at 21:58

## 2017-11-30 RX ADMIN — GABAPENTIN 800 MILLIGRAM(S): 400 CAPSULE ORAL at 08:44

## 2017-11-30 RX ADMIN — FLUPHENAZINE HYDROCHLORIDE 10 MILLIGRAM(S): 1 TABLET, FILM COATED ORAL at 02:10

## 2017-11-30 RX ADMIN — Medication 1.5 MILLIGRAM(S): at 08:44

## 2017-11-30 RX ADMIN — Medication 600 MILLIGRAM(S): at 22:00

## 2017-11-30 NOTE — CONSULT NOTE ADULT - ASSESSMENT
27M admitted for management of bipolar disorder. with asthma,self-reported sciatica on left    Plan:  Asthma: continue symbicort as ordered, albuterol prn, mild persistent, stable    Sciatica: Pt declines NSAIDs (upset stomach) says pain is controlled with neurontin but he also wants lidoderm.  Lioderm Ok daily, ordered    Bipolar disorder: management per primary team
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
## 2017-12-01 PROCEDURE — 99232 SBSQ HOSP IP/OBS MODERATE 35: CPT

## 2017-12-01 RX ADMIN — BUDESONIDE AND FORMOTEROL FUMARATE DIHYDRATE 2 PUFF(S): 160; 4.5 AEROSOL RESPIRATORY (INHALATION) at 20:59

## 2017-12-01 RX ADMIN — Medication 600 MILLIGRAM(S): at 12:34

## 2017-12-01 RX ADMIN — OXCARBAZEPINE 900 MILLIGRAM(S): 300 TABLET, FILM COATED ORAL at 20:58

## 2017-12-01 RX ADMIN — Medication 600 MILLIGRAM(S): at 22:34

## 2017-12-01 RX ADMIN — GABAPENTIN 800 MILLIGRAM(S): 400 CAPSULE ORAL at 20:58

## 2017-12-01 RX ADMIN — Medication 600 MILLIGRAM(S): at 21:00

## 2017-12-01 RX ADMIN — GABAPENTIN 800 MILLIGRAM(S): 400 CAPSULE ORAL at 09:33

## 2017-12-01 RX ADMIN — BUDESONIDE AND FORMOTEROL FUMARATE DIHYDRATE 2 PUFF(S): 160; 4.5 AEROSOL RESPIRATORY (INHALATION) at 09:42

## 2017-12-01 RX ADMIN — LIDOCAINE 1 PATCH: 4 CREAM TOPICAL at 09:33

## 2017-12-01 RX ADMIN — Medication 600 MILLIGRAM(S): at 13:38

## 2017-12-01 RX ADMIN — FLUPHENAZINE HYDROCHLORIDE 10 MILLIGRAM(S): 1 TABLET, FILM COATED ORAL at 20:58

## 2017-12-01 RX ADMIN — Medication 1.5 MILLIGRAM(S): at 09:33

## 2017-12-01 RX ADMIN — OXCARBAZEPINE 600 MILLIGRAM(S): 300 TABLET, FILM COATED ORAL at 09:33

## 2017-12-01 RX ADMIN — Medication 1 EACH: at 08:31

## 2017-12-02 PROCEDURE — 99232 SBSQ HOSP IP/OBS MODERATE 35: CPT

## 2017-12-02 RX ADMIN — GABAPENTIN 800 MILLIGRAM(S): 400 CAPSULE ORAL at 09:42

## 2017-12-02 RX ADMIN — OXCARBAZEPINE 600 MILLIGRAM(S): 300 TABLET, FILM COATED ORAL at 09:42

## 2017-12-02 RX ADMIN — LIDOCAINE 1 PATCH: 4 CREAM TOPICAL at 09:42

## 2017-12-02 RX ADMIN — Medication 600 MILLIGRAM(S): at 11:15

## 2017-12-02 RX ADMIN — Medication 600 MILLIGRAM(S): at 23:57

## 2017-12-02 RX ADMIN — Medication 600 MILLIGRAM(S): at 21:00

## 2017-12-02 RX ADMIN — Medication 1.5 MILLIGRAM(S): at 09:42

## 2017-12-02 RX ADMIN — OXCARBAZEPINE 900 MILLIGRAM(S): 300 TABLET, FILM COATED ORAL at 21:12

## 2017-12-02 RX ADMIN — BUDESONIDE AND FORMOTEROL FUMARATE DIHYDRATE 2 PUFF(S): 160; 4.5 AEROSOL RESPIRATORY (INHALATION) at 09:42

## 2017-12-02 RX ADMIN — GABAPENTIN 800 MILLIGRAM(S): 400 CAPSULE ORAL at 21:12

## 2017-12-02 RX ADMIN — BUDESONIDE AND FORMOTEROL FUMARATE DIHYDRATE 2 PUFF(S): 160; 4.5 AEROSOL RESPIRATORY (INHALATION) at 21:12

## 2017-12-02 RX ADMIN — LIDOCAINE 1 PATCH: 4 CREAM TOPICAL at 21:12

## 2017-12-02 RX ADMIN — Medication 600 MILLIGRAM(S): at 09:43

## 2017-12-02 RX ADMIN — FLUPHENAZINE HYDROCHLORIDE 10 MILLIGRAM(S): 1 TABLET, FILM COATED ORAL at 21:12

## 2017-12-03 PROCEDURE — 99232 SBSQ HOSP IP/OBS MODERATE 35: CPT

## 2017-12-03 RX ADMIN — FLUPHENAZINE HYDROCHLORIDE 10 MILLIGRAM(S): 1 TABLET, FILM COATED ORAL at 21:50

## 2017-12-03 RX ADMIN — Medication 600 MILLIGRAM(S): at 09:07

## 2017-12-03 RX ADMIN — Medication 50 MILLIGRAM(S): at 08:25

## 2017-12-03 RX ADMIN — BUDESONIDE AND FORMOTEROL FUMARATE DIHYDRATE 2 PUFF(S): 160; 4.5 AEROSOL RESPIRATORY (INHALATION) at 21:50

## 2017-12-03 RX ADMIN — Medication 1.5 MILLIGRAM(S): at 08:25

## 2017-12-03 RX ADMIN — Medication 30 MILLILITER(S): at 18:17

## 2017-12-03 RX ADMIN — OXCARBAZEPINE 900 MILLIGRAM(S): 300 TABLET, FILM COATED ORAL at 21:50

## 2017-12-03 RX ADMIN — Medication 600 MILLIGRAM(S): at 08:25

## 2017-12-03 RX ADMIN — LIDOCAINE 1 PATCH: 4 CREAM TOPICAL at 08:25

## 2017-12-03 RX ADMIN — GABAPENTIN 800 MILLIGRAM(S): 400 CAPSULE ORAL at 21:50

## 2017-12-03 RX ADMIN — Medication 2 MILLIGRAM(S): at 08:25

## 2017-12-03 RX ADMIN — GABAPENTIN 800 MILLIGRAM(S): 400 CAPSULE ORAL at 08:25

## 2017-12-03 RX ADMIN — BUDESONIDE AND FORMOTEROL FUMARATE DIHYDRATE 2 PUFF(S): 160; 4.5 AEROSOL RESPIRATORY (INHALATION) at 08:25

## 2017-12-03 RX ADMIN — OXCARBAZEPINE 600 MILLIGRAM(S): 300 TABLET, FILM COATED ORAL at 08:25

## 2017-12-04 PROCEDURE — 90853 GROUP PSYCHOTHERAPY: CPT

## 2017-12-04 PROCEDURE — 99232 SBSQ HOSP IP/OBS MODERATE 35: CPT | Mod: 25

## 2017-12-04 RX ADMIN — GABAPENTIN 800 MILLIGRAM(S): 400 CAPSULE ORAL at 08:07

## 2017-12-04 RX ADMIN — Medication 30 MILLILITER(S): at 05:40

## 2017-12-04 RX ADMIN — Medication 600 MILLIGRAM(S): at 08:13

## 2017-12-04 RX ADMIN — BUDESONIDE AND FORMOTEROL FUMARATE DIHYDRATE 2 PUFF(S): 160; 4.5 AEROSOL RESPIRATORY (INHALATION) at 08:08

## 2017-12-04 RX ADMIN — Medication 600 MILLIGRAM(S): at 09:07

## 2017-12-04 RX ADMIN — Medication 1.5 MILLIGRAM(S): at 08:07

## 2017-12-04 RX ADMIN — LIDOCAINE 1 PATCH: 4 CREAM TOPICAL at 08:07

## 2017-12-04 RX ADMIN — OXCARBAZEPINE 600 MILLIGRAM(S): 300 TABLET, FILM COATED ORAL at 08:08

## 2017-12-05 PROCEDURE — 99232 SBSQ HOSP IP/OBS MODERATE 35: CPT

## 2017-12-05 RX ORDER — FLUPHENAZINE HYDROCHLORIDE 1 MG/1
12.5 TABLET, FILM COATED ORAL ONCE
Qty: 0 | Refills: 0 | Status: COMPLETED | OUTPATIENT
Start: 2017-12-05 | End: 2017-12-05

## 2017-12-05 RX ADMIN — OXCARBAZEPINE 600 MILLIGRAM(S): 300 TABLET, FILM COATED ORAL at 08:09

## 2017-12-05 RX ADMIN — Medication 1.5 MILLIGRAM(S): at 08:09

## 2017-12-05 RX ADMIN — GABAPENTIN 800 MILLIGRAM(S): 400 CAPSULE ORAL at 08:09

## 2017-12-05 RX ADMIN — Medication 2 MILLIGRAM(S): at 21:30

## 2017-12-05 RX ADMIN — GABAPENTIN 800 MILLIGRAM(S): 400 CAPSULE ORAL at 00:20

## 2017-12-05 RX ADMIN — GABAPENTIN 800 MILLIGRAM(S): 400 CAPSULE ORAL at 22:27

## 2017-12-05 RX ADMIN — OXCARBAZEPINE 900 MILLIGRAM(S): 300 TABLET, FILM COATED ORAL at 00:20

## 2017-12-05 RX ADMIN — BUDESONIDE AND FORMOTEROL FUMARATE DIHYDRATE 2 PUFF(S): 160; 4.5 AEROSOL RESPIRATORY (INHALATION) at 22:27

## 2017-12-05 RX ADMIN — Medication 600 MILLIGRAM(S): at 00:20

## 2017-12-05 RX ADMIN — Medication 600 MILLIGRAM(S): at 08:25

## 2017-12-05 RX ADMIN — BUDESONIDE AND FORMOTEROL FUMARATE DIHYDRATE 2 PUFF(S): 160; 4.5 AEROSOL RESPIRATORY (INHALATION) at 00:20

## 2017-12-05 RX ADMIN — Medication 600 MILLIGRAM(S): at 21:30

## 2017-12-05 RX ADMIN — FLUPHENAZINE HYDROCHLORIDE 12.5 MILLIGRAM(S): 1 TABLET, FILM COATED ORAL at 13:02

## 2017-12-05 RX ADMIN — FLUPHENAZINE HYDROCHLORIDE 10 MILLIGRAM(S): 1 TABLET, FILM COATED ORAL at 22:27

## 2017-12-05 RX ADMIN — OXCARBAZEPINE 900 MILLIGRAM(S): 300 TABLET, FILM COATED ORAL at 22:27

## 2017-12-05 RX ADMIN — LIDOCAINE 1 PATCH: 4 CREAM TOPICAL at 08:10

## 2017-12-05 RX ADMIN — BUDESONIDE AND FORMOTEROL FUMARATE DIHYDRATE 2 PUFF(S): 160; 4.5 AEROSOL RESPIRATORY (INHALATION) at 08:09

## 2017-12-05 RX ADMIN — Medication 600 MILLIGRAM(S): at 03:01

## 2017-12-05 RX ADMIN — Medication 600 MILLIGRAM(S): at 09:30

## 2017-12-05 RX ADMIN — FLUPHENAZINE HYDROCHLORIDE 10 MILLIGRAM(S): 1 TABLET, FILM COATED ORAL at 00:20

## 2017-12-06 VITALS — DIASTOLIC BLOOD PRESSURE: 84 MMHG | SYSTOLIC BLOOD PRESSURE: 135 MMHG | TEMPERATURE: 98 F | HEART RATE: 97 BPM

## 2017-12-06 PROCEDURE — 99238 HOSP IP/OBS DSCHRG MGMT 30/<: CPT

## 2017-12-06 RX ORDER — FLUPHENAZINE HYDROCHLORIDE 1 MG/1
1 TABLET, FILM COATED ORAL
Qty: 15 | Refills: 0 | OUTPATIENT
Start: 2017-12-06 | End: 2017-12-21

## 2017-12-06 RX ORDER — GABAPENTIN 400 MG/1
1 CAPSULE ORAL
Qty: 30 | Refills: 0 | OUTPATIENT
Start: 2017-12-06 | End: 2017-12-21

## 2017-12-06 RX ORDER — OXCARBAZEPINE 300 MG/1
1 TABLET, FILM COATED ORAL
Qty: 15 | Refills: 0 | OUTPATIENT
Start: 2017-12-06 | End: 2017-12-21

## 2017-12-06 RX ORDER — OXCARBAZEPINE 300 MG/1
3 TABLET, FILM COATED ORAL
Qty: 45 | Refills: 0 | OUTPATIENT
Start: 2017-12-06 | End: 2017-12-21

## 2017-12-06 RX ORDER — BENZTROPINE MESYLATE 1 MG
3 TABLET ORAL
Qty: 45 | Refills: 0 | OUTPATIENT
Start: 2017-12-06 | End: 2017-12-21

## 2017-12-06 RX ADMIN — Medication 1.5 MILLIGRAM(S): at 08:56

## 2017-12-06 RX ADMIN — Medication 600 MILLIGRAM(S): at 03:58

## 2017-12-06 RX ADMIN — OXCARBAZEPINE 600 MILLIGRAM(S): 300 TABLET, FILM COATED ORAL at 08:57

## 2017-12-06 RX ADMIN — LIDOCAINE 1 PATCH: 4 CREAM TOPICAL at 08:57

## 2017-12-06 RX ADMIN — GABAPENTIN 800 MILLIGRAM(S): 400 CAPSULE ORAL at 08:56

## 2017-12-10 ENCOUNTER — EMERGENCY (EMERGENCY)
Facility: HOSPITAL | Age: 27
LOS: 1 days | Discharge: HOME | End: 2017-12-10
Attending: EMERGENCY MEDICINE | Admitting: EMERGENCY MEDICINE
Payer: MEDICAID

## 2017-12-10 VITALS
DIASTOLIC BLOOD PRESSURE: 104 MMHG | HEART RATE: 118 BPM | RESPIRATION RATE: 18 BRPM | OXYGEN SATURATION: 98 % | SYSTOLIC BLOOD PRESSURE: 162 MMHG | TEMPERATURE: 99 F

## 2017-12-10 VITALS
OXYGEN SATURATION: 100 % | SYSTOLIC BLOOD PRESSURE: 130 MMHG | DIASTOLIC BLOOD PRESSURE: 81 MMHG | RESPIRATION RATE: 18 BRPM | HEART RATE: 100 BPM

## 2017-12-10 LAB
ALBUMIN SERPL ELPH-MCNC: 4.7 G/DL — SIGNIFICANT CHANGE UP (ref 3.3–5)
ALP SERPL-CCNC: 81 U/L — SIGNIFICANT CHANGE UP (ref 40–120)
ALT FLD-CCNC: 23 U/L RC — SIGNIFICANT CHANGE UP (ref 10–45)
ANION GAP SERPL CALC-SCNC: 14 MMOL/L — SIGNIFICANT CHANGE UP (ref 5–17)
AST SERPL-CCNC: 23 U/L — SIGNIFICANT CHANGE UP (ref 10–40)
BILIRUB SERPL-MCNC: 0.3 MG/DL — SIGNIFICANT CHANGE UP (ref 0.2–1.2)
BUN SERPL-MCNC: 16 MG/DL — SIGNIFICANT CHANGE UP (ref 7–23)
CALCIUM SERPL-MCNC: 9.8 MG/DL — SIGNIFICANT CHANGE UP (ref 8.4–10.5)
CHLORIDE SERPL-SCNC: 100 MMOL/L — SIGNIFICANT CHANGE UP (ref 96–108)
CO2 SERPL-SCNC: 24 MMOL/L — SIGNIFICANT CHANGE UP (ref 22–31)
CREAT SERPL-MCNC: 1.23 MG/DL — SIGNIFICANT CHANGE UP (ref 0.5–1.3)
GLUCOSE SERPL-MCNC: 101 MG/DL — HIGH (ref 70–99)
HCT VFR BLD CALC: 49.8 % — SIGNIFICANT CHANGE UP (ref 39–50)
HGB BLD-MCNC: 17.4 G/DL — HIGH (ref 13–17)
MCHC RBC-ENTMCNC: 33.3 PG — SIGNIFICANT CHANGE UP (ref 27–34)
MCHC RBC-ENTMCNC: 35 GM/DL — SIGNIFICANT CHANGE UP (ref 32–36)
MCV RBC AUTO: 95.1 FL — SIGNIFICANT CHANGE UP (ref 80–100)
PLATELET # BLD AUTO: 245 K/UL — SIGNIFICANT CHANGE UP (ref 150–400)
POTASSIUM SERPL-MCNC: 4 MMOL/L — SIGNIFICANT CHANGE UP (ref 3.5–5.3)
POTASSIUM SERPL-SCNC: 4 MMOL/L — SIGNIFICANT CHANGE UP (ref 3.5–5.3)
PROT SERPL-MCNC: 8 G/DL — SIGNIFICANT CHANGE UP (ref 6–8.3)
RBC # BLD: 5.23 M/UL — SIGNIFICANT CHANGE UP (ref 4.2–5.8)
RBC # FLD: 11.5 % — SIGNIFICANT CHANGE UP (ref 10.3–14.5)
SODIUM SERPL-SCNC: 138 MMOL/L — SIGNIFICANT CHANGE UP (ref 135–145)
WBC # BLD: 14.1 K/UL — HIGH (ref 3.8–10.5)
WBC # FLD AUTO: 14.1 K/UL — HIGH (ref 3.8–10.5)

## 2017-12-10 PROCEDURE — 99284 EMERGENCY DEPT VISIT MOD MDM: CPT

## 2017-12-10 PROCEDURE — 85027 COMPLETE CBC AUTOMATED: CPT

## 2017-12-10 PROCEDURE — 99284 EMERGENCY DEPT VISIT MOD MDM: CPT | Mod: 25

## 2017-12-10 PROCEDURE — 80053 COMPREHEN METABOLIC PANEL: CPT

## 2017-12-10 PROCEDURE — 96374 THER/PROPH/DIAG INJ IV PUSH: CPT

## 2017-12-10 RX ORDER — LIDOCAINE 4 G/100G
1 CREAM TOPICAL ONCE
Qty: 0 | Refills: 0 | Status: COMPLETED | OUTPATIENT
Start: 2017-12-10 | End: 2017-12-10

## 2017-12-10 RX ORDER — KETOROLAC TROMETHAMINE 30 MG/ML
30 SYRINGE (ML) INJECTION ONCE
Qty: 0 | Refills: 0 | Status: DISCONTINUED | OUTPATIENT
Start: 2017-12-10 | End: 2017-12-10

## 2017-12-10 RX ADMIN — LIDOCAINE 1 PATCH: 4 CREAM TOPICAL at 17:04

## 2017-12-10 RX ADMIN — Medication 30 MILLIGRAM(S): at 17:04

## 2017-12-10 NOTE — ED PROVIDER NOTE - OBJECTIVE STATEMENT
28 yo M w/PMH bipolar disorder, arrythmia, chronic back pain presenting for back pain. Pain for past year or two. Acute right sided back pain started today radiating to right side. Pt on Neurontin for pain. Pt states no back surgeries. Pt denies incontinence, suicidal ideation, ETOH use, illicit drug use. 26 yo M w/PMH bipolar disorder, arrythmia, chronic back pain presenting for back pain. Pain for past year or two. Acute right sided back pain started today radiating to right side. Pt on Neurontin for pain. Pt states no back surgeries. Pt denies incontinence, urinary retention, suicidal ideation, ETOH use, illicit drug use.

## 2017-12-10 NOTE — ED PROVIDER NOTE - PROGRESS NOTE DETAILS
Rosita JANG: Patient reassessed - he states he feels much better. Patient is eating and states he wants to go home. Will d/c home.

## 2017-12-10 NOTE — ED PROVIDER NOTE - MEDICAL DECISION MAKING DETAILS
Rosita JANG: Patient is a 26 yo M w/ hx of bipolar d/o, mood d/o, p/w back pain. Patient complains of right low back pain radiating down his leg. No trauma, heavy lifting. Patient is tearful. exam: no spine tenderness, no paraspinal muscle spasm, strength equal in b/l LE. Patient reports he can urinate when he needs to. a/p: back pain - no signs or symptoms of cauda equina - will try lidocaine patch and toradol and reassess.

## 2017-12-10 NOTE — ED ADULT NURSE NOTE - CHPI ED SYMPTOMS NEG
no neck tenderness/no bladder dysfunction/no constipation/no difficulty bearing weight/no numbness/no bowel dysfunction/no motor function loss/no fatigue/no tingling

## 2017-12-10 NOTE — ED ADULT NURSE NOTE - OBJECTIVE STATEMENT
Male 27 years old with history of Bipolar Disorder, Asthma and Chronic back pain was brought in by EMS for back pain. Pain is for a year to 2 years. Patient appears very sleepy and cries sometimes. When ask why he's crying, stated "I have pain in my back". Stated he's sleepy because he took Neurontin today. Denies injury, change in bowel and bladder pattern. Denies chest pain and sob, fever, chills, nausea and vomiting. Denies SI and HI. Denies drug and alcohol use. All labs obtained. Safety maintained. Will monitor.

## 2017-12-10 NOTE — ED PROVIDER NOTE - NS_ ATTENDINGSCRIBEDETAILS _ED_A_ED_FT
I,Melina Becker, performed the initial face to face bedside interview with this patient regarding history of present illness, review of symptoms and relevant past medical, social and family history.  I completed an independent physical examination.  I was the initial provider who evaluated this patient. The history, relevant review of systems, past medical and surgical history, medical decision making, and physical examination was documented by the scribe in my presence and I attest to the accuracy of the documentation.

## 2017-12-11 ENCOUNTER — APPOINTMENT (OUTPATIENT)
Dept: INTERNAL MEDICINE | Facility: CLINIC | Age: 27
End: 2017-12-11

## 2017-12-21 ENCOUNTER — INPATIENT (INPATIENT)
Facility: HOSPITAL | Age: 27
LOS: 19 days | Discharge: HOME | End: 2018-01-10
Attending: PSYCHIATRY & NEUROLOGY | Admitting: PSYCHIATRY & NEUROLOGY
Payer: MEDICAID

## 2017-12-21 ENCOUNTER — EMERGENCY (EMERGENCY)
Facility: HOSPITAL | Age: 27
LOS: 1 days | Discharge: PSYCHIATRIC OR SUBSTANCE USE FACILITY | End: 2017-12-21
Admitting: EMERGENCY MEDICINE
Payer: MEDICAID

## 2017-12-21 VITALS
DIASTOLIC BLOOD PRESSURE: 83 MMHG | SYSTOLIC BLOOD PRESSURE: 134 MMHG | OXYGEN SATURATION: 100 % | TEMPERATURE: 98 F | RESPIRATION RATE: 16 BRPM | HEART RATE: 99 BPM

## 2017-12-21 VITALS
DIASTOLIC BLOOD PRESSURE: 117 MMHG | RESPIRATION RATE: 16 BRPM | HEART RATE: 112 BPM | SYSTOLIC BLOOD PRESSURE: 122 MMHG | TEMPERATURE: 98 F | OXYGEN SATURATION: 99 %

## 2017-12-21 VITALS — WEIGHT: 205.03 LBS | HEIGHT: 69 IN | TEMPERATURE: 97 F

## 2017-12-21 DIAGNOSIS — F31.2 BIPOLAR DISORDER, CURRENT EPISODE MANIC SEVERE WITH PSYCHOTIC FEATURES: ICD-10-CM

## 2017-12-21 LAB
ALBUMIN SERPL ELPH-MCNC: 4.4 G/DL — SIGNIFICANT CHANGE UP (ref 3.3–5)
ALP SERPL-CCNC: 79 U/L — SIGNIFICANT CHANGE UP (ref 40–120)
ALT FLD-CCNC: 17 U/L — SIGNIFICANT CHANGE UP (ref 4–41)
APAP SERPL-MCNC: < 15 UG/ML — LOW (ref 15–25)
AST SERPL-CCNC: 19 U/L — SIGNIFICANT CHANGE UP (ref 4–40)
BARBITURATES MEASUREMENT: NEGATIVE — SIGNIFICANT CHANGE UP
BASOPHILS # BLD AUTO: 0.05 K/UL — SIGNIFICANT CHANGE UP (ref 0–0.2)
BASOPHILS NFR BLD AUTO: 0.5 % — SIGNIFICANT CHANGE UP (ref 0–2)
BENZODIAZ SERPL-MCNC: NEGATIVE — SIGNIFICANT CHANGE UP
BILIRUB SERPL-MCNC: < 0.2 MG/DL — LOW (ref 0.2–1.2)
BUN SERPL-MCNC: 14 MG/DL — SIGNIFICANT CHANGE UP (ref 7–23)
CALCIUM SERPL-MCNC: 9.6 MG/DL — SIGNIFICANT CHANGE UP (ref 8.4–10.5)
CHLORIDE SERPL-SCNC: 105 MMOL/L — SIGNIFICANT CHANGE UP (ref 98–107)
CO2 SERPL-SCNC: 24 MMOL/L — SIGNIFICANT CHANGE UP (ref 22–31)
CREAT SERPL-MCNC: 1.09 MG/DL — SIGNIFICANT CHANGE UP (ref 0.5–1.3)
EOSINOPHIL # BLD AUTO: 0.09 K/UL — SIGNIFICANT CHANGE UP (ref 0–0.5)
EOSINOPHIL NFR BLD AUTO: 0.8 % — SIGNIFICANT CHANGE UP (ref 0–6)
ETHANOL BLD-MCNC: < 10 MG/DL — SIGNIFICANT CHANGE UP
GLUCOSE SERPL-MCNC: 91 MG/DL — SIGNIFICANT CHANGE UP (ref 70–99)
HCT VFR BLD CALC: 50.8 % — HIGH (ref 39–50)
HGB BLD-MCNC: 16.7 G/DL — SIGNIFICANT CHANGE UP (ref 13–17)
IMM GRANULOCYTES # BLD AUTO: 0.05 # — SIGNIFICANT CHANGE UP
IMM GRANULOCYTES NFR BLD AUTO: 0.5 % — SIGNIFICANT CHANGE UP (ref 0–1.5)
LYMPHOCYTES # BLD AUTO: 2.87 K/UL — SIGNIFICANT CHANGE UP (ref 1–3.3)
LYMPHOCYTES # BLD AUTO: 26.8 % — SIGNIFICANT CHANGE UP (ref 13–44)
MCHC RBC-ENTMCNC: 30.1 PG — SIGNIFICANT CHANGE UP (ref 27–34)
MCHC RBC-ENTMCNC: 32.9 % — SIGNIFICANT CHANGE UP (ref 32–36)
MCV RBC AUTO: 91.7 FL — SIGNIFICANT CHANGE UP (ref 80–100)
MONOCYTES # BLD AUTO: 0.7 K/UL — SIGNIFICANT CHANGE UP (ref 0–0.9)
MONOCYTES NFR BLD AUTO: 6.5 % — SIGNIFICANT CHANGE UP (ref 2–14)
NEUTROPHILS # BLD AUTO: 6.95 K/UL — SIGNIFICANT CHANGE UP (ref 1.8–7.4)
NEUTROPHILS NFR BLD AUTO: 64.9 % — SIGNIFICANT CHANGE UP (ref 43–77)
NRBC # FLD: 0 — SIGNIFICANT CHANGE UP
PLATELET # BLD AUTO: 307 K/UL — SIGNIFICANT CHANGE UP (ref 150–400)
PMV BLD: 9.1 FL — SIGNIFICANT CHANGE UP (ref 7–13)
POTASSIUM SERPL-MCNC: 3.9 MMOL/L — SIGNIFICANT CHANGE UP (ref 3.5–5.3)
POTASSIUM SERPL-SCNC: 3.9 MMOL/L — SIGNIFICANT CHANGE UP (ref 3.5–5.3)
PROT SERPL-MCNC: 7.7 G/DL — SIGNIFICANT CHANGE UP (ref 6–8.3)
RBC # BLD: 5.54 M/UL — SIGNIFICANT CHANGE UP (ref 4.2–5.8)
RBC # FLD: 12 % — SIGNIFICANT CHANGE UP (ref 10.3–14.5)
SALICYLATES SERPL-MCNC: < 5 MG/DL — LOW (ref 15–30)
SODIUM SERPL-SCNC: 143 MMOL/L — SIGNIFICANT CHANGE UP (ref 135–145)
TSH SERPL-MCNC: 1.4 UIU/ML — SIGNIFICANT CHANGE UP (ref 0.27–4.2)
WBC # BLD: 10.71 K/UL — HIGH (ref 3.8–10.5)
WBC # FLD AUTO: 10.71 K/UL — HIGH (ref 3.8–10.5)

## 2017-12-21 PROCEDURE — 93010 ELECTROCARDIOGRAM REPORT: CPT

## 2017-12-21 PROCEDURE — 99285 EMERGENCY DEPT VISIT HI MDM: CPT

## 2017-12-21 PROCEDURE — 99285 EMERGENCY DEPT VISIT HI MDM: CPT | Mod: 25

## 2017-12-21 RX ORDER — BENZTROPINE MESYLATE 1 MG
1.5 TABLET ORAL DAILY
Qty: 0 | Refills: 0 | Status: DISCONTINUED | OUTPATIENT
Start: 2017-12-21 | End: 2018-01-10

## 2017-12-21 RX ORDER — HALOPERIDOL DECANOATE 100 MG/ML
5 INJECTION INTRAMUSCULAR ONCE
Qty: 0 | Refills: 0 | Status: COMPLETED | OUTPATIENT
Start: 2017-12-21 | End: 2017-12-21

## 2017-12-21 RX ORDER — FLUPHENAZINE HYDROCHLORIDE 1 MG/1
10 TABLET, FILM COATED ORAL AT BEDTIME
Qty: 0 | Refills: 0 | Status: DISCONTINUED | OUTPATIENT
Start: 2017-12-21 | End: 2017-12-21

## 2017-12-21 RX ORDER — OXCARBAZEPINE 300 MG/1
600 TABLET, FILM COATED ORAL
Qty: 0 | Refills: 0 | Status: DISCONTINUED | OUTPATIENT
Start: 2017-12-21 | End: 2017-12-21

## 2017-12-21 RX ORDER — GABAPENTIN 400 MG/1
800 CAPSULE ORAL
Qty: 0 | Refills: 0 | Status: DISCONTINUED | OUTPATIENT
Start: 2017-12-21 | End: 2017-12-21

## 2017-12-21 RX ORDER — IBUPROFEN 200 MG
600 TABLET ORAL EVERY 8 HOURS
Qty: 0 | Refills: 0 | Status: DISCONTINUED | OUTPATIENT
Start: 2017-12-21 | End: 2018-01-10

## 2017-12-21 RX ORDER — HALOPERIDOL DECANOATE 100 MG/ML
5 INJECTION INTRAMUSCULAR ONCE
Qty: 0 | Refills: 0 | Status: DISCONTINUED | OUTPATIENT
Start: 2017-12-21 | End: 2018-01-10

## 2017-12-21 RX ORDER — FLUPHENAZINE HYDROCHLORIDE 1 MG/1
10 TABLET, FILM COATED ORAL AT BEDTIME
Qty: 0 | Refills: 0 | Status: DISCONTINUED | OUTPATIENT
Start: 2017-12-21 | End: 2018-01-01

## 2017-12-21 RX ORDER — ALBUTEROL 90 UG/1
2 AEROSOL, METERED ORAL EVERY 6 HOURS
Qty: 0 | Refills: 0 | Status: DISCONTINUED | OUTPATIENT
Start: 2017-12-21 | End: 2018-01-10

## 2017-12-21 RX ORDER — NICOTINE POLACRILEX 2 MG
1 GUM BUCCAL
Qty: 0 | Refills: 0 | Status: DISCONTINUED | OUTPATIENT
Start: 2017-12-21 | End: 2018-01-10

## 2017-12-21 RX ORDER — BUDESONIDE AND FORMOTEROL FUMARATE DIHYDRATE 160; 4.5 UG/1; UG/1
2 AEROSOL RESPIRATORY (INHALATION)
Qty: 0 | Refills: 0 | Status: DISCONTINUED | OUTPATIENT
Start: 2017-12-21 | End: 2018-01-10

## 2017-12-21 RX ORDER — GABAPENTIN 400 MG/1
800 CAPSULE ORAL
Qty: 0 | Refills: 0 | Status: DISCONTINUED | OUTPATIENT
Start: 2017-12-21 | End: 2018-01-02

## 2017-12-21 RX ORDER — HALOPERIDOL DECANOATE 100 MG/ML
5 INJECTION INTRAMUSCULAR EVERY 6 HOURS
Qty: 0 | Refills: 0 | Status: DISCONTINUED | OUTPATIENT
Start: 2017-12-21 | End: 2018-01-10

## 2017-12-21 RX ADMIN — Medication 2 MILLIGRAM(S): at 12:53

## 2017-12-21 RX ADMIN — GABAPENTIN 800 MILLIGRAM(S): 400 CAPSULE ORAL at 22:48

## 2017-12-21 RX ADMIN — Medication 600 MILLIGRAM(S): at 19:02

## 2017-12-21 RX ADMIN — FLUPHENAZINE HYDROCHLORIDE 10 MILLIGRAM(S): 1 TABLET, FILM COATED ORAL at 22:48

## 2017-12-21 RX ADMIN — Medication 600 MILLIGRAM(S): at 18:08

## 2017-12-21 RX ADMIN — HALOPERIDOL DECANOATE 5 MILLIGRAM(S): 100 INJECTION INTRAMUSCULAR at 12:53

## 2017-12-21 NOTE — ED BEHAVIORAL HEALTH ASSESSMENT NOTE - PAST PSYCHOTROPIC MEDICATION
Per chart review, Lamictal - rash Latuda- more suicidal, Zyprexa- severe sedation, Seroquel - weight gain sedation Lithium- nausea/vomiting, Depakote- rash ?  Celexa- unclear why stopped, Wellbutrin-ineffective Abilify, Per chart review, Lamictal - rash Latuda- more suicidal, Zyprexa- severe sedation, Seroquel - weight gain sedation Lithium- nausea/vomiting, Depakote- rash ?  Celexa- unclear why stopped, Wellbutrin-ineffective
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
## 2017-12-21 NOTE — ED BEHAVIORAL HEALTH ASSESSMENT NOTE - CURRENT MEDICATION
Abilify Maintena 300mg IM qMonthly (last given 11/6), Prolixin 10mg qHS, Oxtellar XR 600mg qAM + 900mg qHS, Neurontin 600mg BID, Cogentin 1.5mg daily Prolixin Decanoate 12.5mg IM given on 12/5, Prolixin 10mg qHS, Oxtellar XR 600mg qAM + 900mg qHS, Neurontin 600mg BID, Cogentin 1.5mg daily,     recent treatment on Abilify Maintena 300mg IM qMonthly (last given 11/6) Prolixin Decanoate 12.5mg IM given on 12/5, Prolixin 10mg qHS, Oxtellar XR 600mg qAM + 900mg qHS, Neurontin 600mg BID, Cogentin 1.5mg daily,     recent treatment on Abilify Maintena 300mg IM qMonthly

## 2017-12-21 NOTE — ED BEHAVIORAL HEALTH ASSESSMENT NOTE - OTHER
substance use ranging from irritable/angry to tearful to laughing/smiling substance [facility] psychiatrist, Dr. Lynch ([phone]) ranging from irritable/angry to tearful legal problems with recent arraignment

## 2017-12-21 NOTE — ED ADULT TRIAGE NOTE - CHIEF COMPLAINT QUOTE
Pt galindo from [facility] out pt facility, sent over for agitation, hx of bipolar disorder, complaint with medication, denies any si/hi/ auditory or visual hallucinations, was dc'd from [place] yesterday. pt unsure why he was brought here, ems stats pt brought over for agitation. no medical complaints

## 2017-12-21 NOTE — ED BEHAVIORAL HEALTH ASSESSMENT NOTE - DESCRIPTION
Pt sleeping s/p Versed 10mg in the ambulance. Later he is calm but irritable.     Vital Signs Last 24 Hrs  T(C): 37 (27 Nov 2017 11:34), Max: 37 (27 Nov 2017 11:34)  T(F): 98.6 (27 Nov 2017 11:34), Max: 98.6 (27 Nov 2017 11:34)  HR: 115 (27 Nov 2017 12:09) (115 - 130)  BP: 114/71 (27 Nov 2017 12:09) (114/71 - 156/116)  BP(mean): --  RR: 16 (27 Nov 2017 12:09) (16 - 16)  SpO2: 99% (27 Nov 2017 12:09) (99% - 100%) leg pain, asthma [employer] graduate from [place], living w/ parents Patient was initially irritable, put self on floor, received Haldol 5mg and Ativan 2mg PO x 1 with benefit.    Vital Signs Last 24 Hrs  T(C): 37 (27 Nov 2017 11:34), Max: 37 (27 Nov 2017 11:34)  T(F): 98.6 (27 Nov 2017 11:34), Max: 98.6 (27 Nov 2017 11:34)  HR: 115 (27 Nov 2017 12:09) (115 - 130)  BP: 114/71 (27 Nov 2017 12:09) (114/71 - 156/116)  BP(mean): --  RR: 16 (27 Nov 2017 12:09) (16 - 16)  SpO2: 99% (27 Nov 2017 12:09) (99% - 100%)
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
## 2017-12-21 NOTE — ED PROVIDER NOTE - MEDICAL DECISION MAKING DETAILS
This is a 27 year old Male BIBA for psych eval. Patient reports he was sent in from his outpatient facility today. Medical evaluation performed. There is no clinical evidence of intoxication or any acute medical problem requiring immediate intervention.

## 2017-12-21 NOTE — ED BEHAVIORAL HEALTH ASSESSMENT NOTE - SUICIDE RISK FACTORS
Mood episode/Highly impulsive behavior/Substance abuse/dependence/Agitation/severe anxiety/Chronic pain or acute medical issue

## 2017-12-21 NOTE — ED BEHAVIORAL HEALTH ASSESSMENT NOTE - OTHER PAST PSYCHIATRIC HISTORY (INCLUDE DETAILS REGARDING ONSET, COURSE OF ILLNESS, INPATIENT/OUTPATIENT TREATMENT)
Dx w/ Bipolar I disorder with psychotic features, many prior inpt admissions, currently in outpt treatment at [facility], hx of tx with ECT Dx w/ Bipolar I disorder with psychotic features, many prior inpt admissions including recent hospitalization at [place], [facility]6/17, currently in outpt treatment at [facility] and sees Dr. Lynch, hx of tx with ECT Dx w/ Bipolar I disorder with psychotic features, many prior inpt admissions including recent hospitalization at [place], past [facility] 6 hospitalization from 11/27/17-12/6/17, currently in outpatient treatment at [facility] and sees Dr. Lynch,  of tx with ECT

## 2017-12-21 NOTE — ED BEHAVIORAL HEALTH ASSESSMENT NOTE - MEDICAL ISSUES AND PLAN (INCLUDE STANDING AND PRN MEDICATION)
body pain/neuropathic pain: Neurontin 600mg BID, Asthma: Symbicort daily, Albuterol prn body pain/neuropathic pain: Neurontin 800mg BID, Asthma: Symbicort daily, Albuterol prn SOB/wheezing, Maalox 30mL PO q6hr prn dyspepsia

## 2017-12-21 NOTE — ED BEHAVIORAL HEALTH ASSESSMENT NOTE - PSYCHIATRIC ISSUES AND PLAN (INCLUDE STANDING AND PRN MEDICATION)
psychosis & nancy: Abilify 300mg IM due 12/4, continue home meds Prolixin 10mg qHS, Oxtellar XR 600mg qAM + 900mg qHS (f/u blood level), Neurontin 600mg BID, Cogentin 1.5mg daily. PRNs: Haldol 5mg PO/IM, Ativan 2mg PO/IM, Benadryl 50mg PO/IM q6h prn agitation psychosis & nancy: continue home meds Prolixin 10mg qHS, restart Oxtellar XR 600mg qAM + 900mg qHS (f/u blood level), Neurontin 600mg BID, Cogentin 1.5mg daily. PRNs: Haldol 5mg PO/IM, Ativan 2mg PO/IM, Benadryl 50mg PO/IM q6h prn agitation psychosis & nancy: continue home meds Prolixin 10mg qHS, restart Trileptal at 600mg BID, Neurontin 800mg BID, Cogentin 1.5mg daily. PRNs: Haldol 5mg PO/IM, Ativan 2mg PO/IM prn agitation

## 2017-12-21 NOTE — ED BEHAVIORAL HEALTH NOTE - BEHAVIORAL HEALTH NOTE
Patient is a 27 year old male sent to the emergency room from [facility] for admission.  Writer was informed patient was discharged from [place] 12/20/17.  Writer called [facility]  CPE and spoke to Dr. Jhaveri to obtain the following collateral.  History states he has been to [facility] multiple times and only [place] once because he was under arrest.  Patient was in good behavioral control while he was there.  Diagnosed with Bipolar schizoaffective, hx of arrests.  Patient was initially Abilify and Trileptal and additional psychiatric testing was scheduled to determine actual psychiatric diagnosis, but patient was discharged on his own recognizance after arraignment and pland was to return to [facility].  Patient was discharged on Abilify 10mg Cogentin 0.5 BID, Triletal 300mg BID, Gabapentin 800mg BID, Budesonide-Formoterol 160-4.5 2 puffs a day for asthma.

## 2017-12-21 NOTE — ED PROVIDER NOTE - OBJECTIVE STATEMENT
This is a 27 year old Male BIBA for psych eval. Patient reports he was sent in from his outpatient facility today. States "I do not know why I am here. My doctor does that a lot. He has been my psychiatrist since 2011 and calls EMS frequently. I think he is scared of me"  Reports recent discharge for [facility] yesterday from psychiatry. Patient presents with generalized nonspecific complaints.

## 2017-12-21 NOTE — ED ADULT NURSE NOTE - CHIEF COMPLAINT QUOTE
Pt galindo from [facility] out pt facility, sent over for agitation, hx of bipolar disorder, complaint with medication, denies any si/hi/ auditory or visual hallucinations, was dc'd from [place] yesterday. pt unsure why he was brought here, ems stats pt brought over for agitation. no medical complaints

## 2017-12-21 NOTE — ED ADULT NURSE NOTE - OBJECTIVE STATEMENT
Received pt in  pt sent from  out patient clinic for agitation, pt uncooperative  medicated with Ativan & Haldol  po as per MD rx,eval on going.
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
## 2017-12-21 NOTE — ED BEHAVIORAL HEALTH ASSESSMENT NOTE - AXIS IV
Problem related to social environment/Other psychosocial and environmental problems Problems with interaction with legal system/Problem related to social environment
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
## 2017-12-21 NOTE — ED BEHAVIORAL HEALTH ASSESSMENT NOTE - HPI (INCLUDE ILLNESS QUALITY, SEVERITY, DURATION, TIMING, CONTEXT, MODIFYING FACTORS, ASSOCIATED SIGNS AND SYMPTOMS)
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 safety care for himself in the community at this time.    Patient has been on Abilify Maintena 300mg, last given 11/6 (unclear if given on 12/8), was given Prolixin Decanoate 12.5mg IM on 12/5.  He also had been taking Prolixin 10mg qHS, Oxtellar XR 600mg qAM and 900mg qHS, Neurontin 600mg BID (pt often very somatically preoccupied, complains of nerve pain in legs), and Cogentin 1.5mg (for tremor).     Pt collateral from pt's mother (Divya Evans [phone]) indicates concerns for patient's safety at this time, reports patient was discharged yesterday to court yesterday and didn't sleep at all last night.  She reports patient has been restless and appears to be psychotic at this time.  Mother reports that patient is notably suspicious this morning as he was refusing to sign for release of his medical records and expresses safety concerns for the patient at this time, advocating for inpatient psychiatric hospitalization.  Collateral is obtained from outpatient psychiatrist, Dr. Lynch who reports on evaluation today patient was not at baseline and is acutely psychotic, delusional with gross disorganization.  Dr. Lynch, who knows the patient for years from outpatient treatment is advocating for inpatient psychiatric admission at this time due to acute safety concerns.  The possibility of exploring AOT/ACT team is suggested as well.    See  note for collateral obtained from [facility] by JERE

## 2017-12-21 NOTE — ED BEHAVIORAL HEALTH ASSESSMENT NOTE - SUBSTANCE ISSUES AND PLAN (INCLUDE STANDING AND PRN MEDICATION)
suspected marijuana use. Substance counseling. suspected marijuana use. Substance counseling.  No indication for CIWA/ CINA, no acute evidence of withdrawal or intoxication.

## 2017-12-22 PROCEDURE — 90853 GROUP PSYCHOTHERAPY: CPT

## 2017-12-22 PROCEDURE — 90870 ELECTROCONVULSIVE THERAPY: CPT

## 2017-12-22 PROCEDURE — 99223 1ST HOSP IP/OBS HIGH 75: CPT

## 2017-12-22 RX ORDER — ACETAMINOPHEN 500 MG
650 TABLET ORAL ONCE
Qty: 0 | Refills: 0 | Status: COMPLETED | OUTPATIENT
Start: 2017-12-22 | End: 2017-12-22

## 2017-12-22 RX ORDER — IBUPROFEN 200 MG
400 TABLET ORAL EVERY 6 HOURS
Qty: 0 | Refills: 0 | Status: DISCONTINUED | OUTPATIENT
Start: 2017-12-22 | End: 2017-12-22

## 2017-12-22 RX ORDER — ARIPIPRAZOLE 15 MG/1
300 TABLET ORAL ONCE
Qty: 0 | Refills: 0 | Status: COMPLETED | OUTPATIENT
Start: 2017-12-22 | End: 2017-12-23

## 2017-12-22 RX ORDER — ONDANSETRON 8 MG/1
4 TABLET, FILM COATED ORAL ONCE
Qty: 0 | Refills: 0 | Status: COMPLETED | OUTPATIENT
Start: 2017-12-22 | End: 2017-12-22

## 2017-12-22 RX ADMIN — Medication 600 MILLIGRAM(S): at 17:40

## 2017-12-22 RX ADMIN — Medication 600 MILLIGRAM(S): at 05:16

## 2017-12-22 RX ADMIN — FLUPHENAZINE HYDROCHLORIDE 10 MILLIGRAM(S): 1 TABLET, FILM COATED ORAL at 21:37

## 2017-12-22 RX ADMIN — BUDESONIDE AND FORMOTEROL FUMARATE DIHYDRATE 2 PUFF(S): 160; 4.5 AEROSOL RESPIRATORY (INHALATION) at 07:53

## 2017-12-22 RX ADMIN — Medication 600 MILLIGRAM(S): at 06:18

## 2017-12-22 RX ADMIN — Medication 1.5 MILLIGRAM(S): at 08:41

## 2017-12-22 RX ADMIN — GABAPENTIN 800 MILLIGRAM(S): 400 CAPSULE ORAL at 21:37

## 2017-12-22 RX ADMIN — BUDESONIDE AND FORMOTEROL FUMARATE DIHYDRATE 2 PUFF(S): 160; 4.5 AEROSOL RESPIRATORY (INHALATION) at 21:44

## 2017-12-23 LAB
CHOLEST SERPL-MCNC: 216 MG/DL — HIGH (ref 120–199)
HBA1C BLD-MCNC: 5.4 % — SIGNIFICANT CHANGE UP (ref 4–5.6)
HDLC SERPL-MCNC: 53 MG/DL — SIGNIFICANT CHANGE UP (ref 35–55)
LIPID PNL WITH DIRECT LDL SERPL: 138 MG/DL — SIGNIFICANT CHANGE UP
TRIGL SERPL-MCNC: 212 MG/DL — HIGH (ref 10–149)

## 2017-12-23 PROCEDURE — 99232 SBSQ HOSP IP/OBS MODERATE 35: CPT

## 2017-12-23 RX ORDER — ONDANSETRON 8 MG/1
4 TABLET, FILM COATED ORAL ONCE
Qty: 0 | Refills: 0 | Status: COMPLETED | OUTPATIENT
Start: 2017-12-23 | End: 2017-12-23

## 2017-12-23 RX ORDER — ACETAMINOPHEN 500 MG
650 TABLET ORAL EVERY 6 HOURS
Qty: 0 | Refills: 0 | Status: DISCONTINUED | OUTPATIENT
Start: 2017-12-23 | End: 2018-01-10

## 2017-12-23 RX ADMIN — Medication 650 MILLIGRAM(S): at 14:14

## 2017-12-23 RX ADMIN — BUDESONIDE AND FORMOTEROL FUMARATE DIHYDRATE 2 PUFF(S): 160; 4.5 AEROSOL RESPIRATORY (INHALATION) at 10:10

## 2017-12-23 RX ADMIN — ARIPIPRAZOLE 300 MILLIGRAM(S): 15 TABLET ORAL at 10:00

## 2017-12-23 RX ADMIN — ONDANSETRON 4 MILLIGRAM(S): 8 TABLET, FILM COATED ORAL at 02:35

## 2017-12-23 RX ADMIN — GABAPENTIN 800 MILLIGRAM(S): 400 CAPSULE ORAL at 21:28

## 2017-12-23 RX ADMIN — BUDESONIDE AND FORMOTEROL FUMARATE DIHYDRATE 2 PUFF(S): 160; 4.5 AEROSOL RESPIRATORY (INHALATION) at 20:56

## 2017-12-23 RX ADMIN — Medication 650 MILLIGRAM(S): at 00:12

## 2017-12-23 RX ADMIN — Medication 30 MILLILITER(S): at 02:40

## 2017-12-23 RX ADMIN — GABAPENTIN 800 MILLIGRAM(S): 400 CAPSULE ORAL at 10:01

## 2017-12-23 RX ADMIN — Medication 600 MILLIGRAM(S): at 10:01

## 2017-12-23 RX ADMIN — FLUPHENAZINE HYDROCHLORIDE 10 MILLIGRAM(S): 1 TABLET, FILM COATED ORAL at 21:28

## 2017-12-23 RX ADMIN — Medication 1.5 MILLIGRAM(S): at 10:01

## 2017-12-23 RX ADMIN — Medication 600 MILLIGRAM(S): at 12:57

## 2017-12-23 RX ADMIN — Medication 650 MILLIGRAM(S): at 01:10

## 2017-12-23 RX ADMIN — ONDANSETRON 4 MILLIGRAM(S): 8 TABLET, FILM COATED ORAL at 00:13

## 2017-12-24 PROCEDURE — 99232 SBSQ HOSP IP/OBS MODERATE 35: CPT

## 2017-12-24 RX ADMIN — GABAPENTIN 800 MILLIGRAM(S): 400 CAPSULE ORAL at 22:49

## 2017-12-24 RX ADMIN — Medication 1.5 MILLIGRAM(S): at 09:58

## 2017-12-24 RX ADMIN — BUDESONIDE AND FORMOTEROL FUMARATE DIHYDRATE 2 PUFF(S): 160; 4.5 AEROSOL RESPIRATORY (INHALATION) at 22:45

## 2017-12-24 RX ADMIN — GABAPENTIN 800 MILLIGRAM(S): 400 CAPSULE ORAL at 09:58

## 2017-12-24 RX ADMIN — BUDESONIDE AND FORMOTEROL FUMARATE DIHYDRATE 2 PUFF(S): 160; 4.5 AEROSOL RESPIRATORY (INHALATION) at 09:57

## 2017-12-24 RX ADMIN — FLUPHENAZINE HYDROCHLORIDE 10 MILLIGRAM(S): 1 TABLET, FILM COATED ORAL at 22:49

## 2017-12-25 PROCEDURE — 99231 SBSQ HOSP IP/OBS SF/LOW 25: CPT

## 2017-12-25 RX ADMIN — GABAPENTIN 800 MILLIGRAM(S): 400 CAPSULE ORAL at 09:18

## 2017-12-25 RX ADMIN — Medication 650 MILLIGRAM(S): at 05:47

## 2017-12-25 RX ADMIN — BUDESONIDE AND FORMOTEROL FUMARATE DIHYDRATE 2 PUFF(S): 160; 4.5 AEROSOL RESPIRATORY (INHALATION) at 09:18

## 2017-12-25 RX ADMIN — GABAPENTIN 800 MILLIGRAM(S): 400 CAPSULE ORAL at 21:37

## 2017-12-25 RX ADMIN — FLUPHENAZINE HYDROCHLORIDE 10 MILLIGRAM(S): 1 TABLET, FILM COATED ORAL at 21:37

## 2017-12-25 RX ADMIN — Medication 1 EACH: at 17:21

## 2017-12-25 RX ADMIN — Medication 1.5 MILLIGRAM(S): at 09:18

## 2017-12-25 RX ADMIN — BUDESONIDE AND FORMOTEROL FUMARATE DIHYDRATE 2 PUFF(S): 160; 4.5 AEROSOL RESPIRATORY (INHALATION) at 21:37

## 2017-12-26 PROCEDURE — 99232 SBSQ HOSP IP/OBS MODERATE 35: CPT

## 2017-12-26 RX ORDER — POLYETHYLENE GLYCOL 3350 17 G/17G
17 POWDER, FOR SOLUTION ORAL DAILY
Qty: 0 | Refills: 0 | Status: DISCONTINUED | OUTPATIENT
Start: 2017-12-26 | End: 2018-01-10

## 2017-12-26 RX ADMIN — Medication 1.5 MILLIGRAM(S): at 08:33

## 2017-12-26 RX ADMIN — FLUPHENAZINE HYDROCHLORIDE 10 MILLIGRAM(S): 1 TABLET, FILM COATED ORAL at 21:28

## 2017-12-26 RX ADMIN — BUDESONIDE AND FORMOTEROL FUMARATE DIHYDRATE 2 PUFF(S): 160; 4.5 AEROSOL RESPIRATORY (INHALATION) at 21:29

## 2017-12-26 RX ADMIN — GABAPENTIN 800 MILLIGRAM(S): 400 CAPSULE ORAL at 08:33

## 2017-12-26 RX ADMIN — BUDESONIDE AND FORMOTEROL FUMARATE DIHYDRATE 2 PUFF(S): 160; 4.5 AEROSOL RESPIRATORY (INHALATION) at 08:33

## 2017-12-26 RX ADMIN — Medication 1 EACH: at 15:23

## 2017-12-26 RX ADMIN — GABAPENTIN 800 MILLIGRAM(S): 400 CAPSULE ORAL at 21:29

## 2017-12-26 RX ADMIN — Medication 2 MILLIGRAM(S): at 09:45

## 2017-12-27 PROCEDURE — 99232 SBSQ HOSP IP/OBS MODERATE 35: CPT | Mod: 25

## 2017-12-27 PROCEDURE — 90870 ELECTROCONVULSIVE THERAPY: CPT

## 2017-12-27 RX ADMIN — BUDESONIDE AND FORMOTEROL FUMARATE DIHYDRATE 2 PUFF(S): 160; 4.5 AEROSOL RESPIRATORY (INHALATION) at 10:10

## 2017-12-27 RX ADMIN — Medication 650 MILLIGRAM(S): at 17:28

## 2017-12-27 RX ADMIN — Medication 1.5 MILLIGRAM(S): at 19:04

## 2017-12-28 PROCEDURE — 99232 SBSQ HOSP IP/OBS MODERATE 35: CPT

## 2017-12-28 RX ADMIN — POLYETHYLENE GLYCOL 3350 17 GRAM(S): 17 POWDER, FOR SOLUTION ORAL at 08:56

## 2017-12-28 RX ADMIN — GABAPENTIN 800 MILLIGRAM(S): 400 CAPSULE ORAL at 08:42

## 2017-12-28 RX ADMIN — Medication 600 MILLIGRAM(S): at 22:51

## 2017-12-28 RX ADMIN — GABAPENTIN 800 MILLIGRAM(S): 400 CAPSULE ORAL at 21:55

## 2017-12-28 RX ADMIN — Medication 600 MILLIGRAM(S): at 22:05

## 2017-12-28 RX ADMIN — ALBUTEROL 2 PUFF(S): 90 AEROSOL, METERED ORAL at 08:43

## 2017-12-28 RX ADMIN — BUDESONIDE AND FORMOTEROL FUMARATE DIHYDRATE 2 PUFF(S): 160; 4.5 AEROSOL RESPIRATORY (INHALATION) at 22:03

## 2017-12-28 RX ADMIN — Medication 600 MILLIGRAM(S): at 15:08

## 2017-12-28 RX ADMIN — Medication 1 EACH: at 12:55

## 2017-12-28 RX ADMIN — FLUPHENAZINE HYDROCHLORIDE 10 MILLIGRAM(S): 1 TABLET, FILM COATED ORAL at 21:55

## 2017-12-28 RX ADMIN — Medication 1.5 MILLIGRAM(S): at 08:42

## 2017-12-28 RX ADMIN — BUDESONIDE AND FORMOTEROL FUMARATE DIHYDRATE 2 PUFF(S): 160; 4.5 AEROSOL RESPIRATORY (INHALATION) at 08:43

## 2017-12-28 RX ADMIN — Medication 600 MILLIGRAM(S): at 14:02

## 2017-12-29 PROCEDURE — 99232 SBSQ HOSP IP/OBS MODERATE 35: CPT

## 2017-12-29 PROCEDURE — 99222 1ST HOSP IP/OBS MODERATE 55: CPT

## 2017-12-29 RX ORDER — LIDOCAINE 4 G/100G
1 CREAM TOPICAL DAILY
Qty: 0 | Refills: 0 | Status: DISCONTINUED | OUTPATIENT
Start: 2017-12-29 | End: 2018-01-10

## 2017-12-29 RX ADMIN — FLUPHENAZINE HYDROCHLORIDE 10 MILLIGRAM(S): 1 TABLET, FILM COATED ORAL at 22:14

## 2017-12-29 RX ADMIN — Medication 1.5 MILLIGRAM(S): at 08:32

## 2017-12-29 RX ADMIN — GABAPENTIN 800 MILLIGRAM(S): 400 CAPSULE ORAL at 22:14

## 2017-12-29 RX ADMIN — BUDESONIDE AND FORMOTEROL FUMARATE DIHYDRATE 2 PUFF(S): 160; 4.5 AEROSOL RESPIRATORY (INHALATION) at 22:14

## 2017-12-29 RX ADMIN — Medication 1 EACH: at 12:02

## 2017-12-29 RX ADMIN — Medication 650 MILLIGRAM(S): at 21:00

## 2017-12-29 RX ADMIN — BUDESONIDE AND FORMOTEROL FUMARATE DIHYDRATE 2 PUFF(S): 160; 4.5 AEROSOL RESPIRATORY (INHALATION) at 09:33
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
## 2017-12-29 NOTE — CONSULT NOTE ADULT - ASSESSMENT
27M admitted for management of bipolar disorder with unchanged chronic sciatica    Plan:    Sciatica: Chronic issue, no new changes in symptoms. Recommend supportive measures such as heat packs, avoidance of exacerbating positions, outpt physical therapy, NSAIDs for pain control. Can also try lidoderm if no improvement with nsaids. No further medical intervention is necessary at this time.     Bipolar disorder: management per psych    Plan discussed with patient.

## 2017-12-30 PROCEDURE — 99232 SBSQ HOSP IP/OBS MODERATE 35: CPT

## 2017-12-30 RX ADMIN — BUDESONIDE AND FORMOTEROL FUMARATE DIHYDRATE 2 PUFF(S): 160; 4.5 AEROSOL RESPIRATORY (INHALATION) at 10:49

## 2017-12-30 RX ADMIN — GABAPENTIN 800 MILLIGRAM(S): 400 CAPSULE ORAL at 22:46

## 2017-12-30 RX ADMIN — GABAPENTIN 800 MILLIGRAM(S): 400 CAPSULE ORAL at 10:49

## 2017-12-30 RX ADMIN — Medication 1.5 MILLIGRAM(S): at 10:49

## 2017-12-30 RX ADMIN — BUDESONIDE AND FORMOTEROL FUMARATE DIHYDRATE 2 PUFF(S): 160; 4.5 AEROSOL RESPIRATORY (INHALATION) at 22:46

## 2017-12-30 RX ADMIN — FLUPHENAZINE HYDROCHLORIDE 10 MILLIGRAM(S): 1 TABLET, FILM COATED ORAL at 22:46

## 2017-12-31 PROCEDURE — 99231 SBSQ HOSP IP/OBS SF/LOW 25: CPT

## 2017-12-31 RX ADMIN — POLYETHYLENE GLYCOL 3350 17 GRAM(S): 17 POWDER, FOR SOLUTION ORAL at 09:11

## 2017-12-31 RX ADMIN — BUDESONIDE AND FORMOTEROL FUMARATE DIHYDRATE 2 PUFF(S): 160; 4.5 AEROSOL RESPIRATORY (INHALATION) at 09:10

## 2017-12-31 RX ADMIN — BUDESONIDE AND FORMOTEROL FUMARATE DIHYDRATE 2 PUFF(S): 160; 4.5 AEROSOL RESPIRATORY (INHALATION) at 22:05

## 2017-12-31 RX ADMIN — Medication 1.5 MILLIGRAM(S): at 09:10

## 2017-12-31 RX ADMIN — GABAPENTIN 800 MILLIGRAM(S): 400 CAPSULE ORAL at 22:02

## 2017-12-31 RX ADMIN — Medication 1 EACH: at 15:13

## 2017-12-31 RX ADMIN — GABAPENTIN 800 MILLIGRAM(S): 400 CAPSULE ORAL at 09:10

## 2017-12-31 RX ADMIN — FLUPHENAZINE HYDROCHLORIDE 10 MILLIGRAM(S): 1 TABLET, FILM COATED ORAL at 22:02

## 2018-01-01 PROCEDURE — 99232 SBSQ HOSP IP/OBS MODERATE 35: CPT

## 2018-01-01 RX ORDER — FLUPHENAZINE HYDROCHLORIDE 1 MG/1
5 TABLET, FILM COATED ORAL AT BEDTIME
Qty: 0 | Refills: 0 | Status: COMPLETED | OUTPATIENT
Start: 2018-01-01 | End: 2018-01-02

## 2018-01-01 RX ADMIN — Medication 1 EACH: at 16:04

## 2018-01-01 RX ADMIN — FLUPHENAZINE HYDROCHLORIDE 5 MILLIGRAM(S): 1 TABLET, FILM COATED ORAL at 22:04

## 2018-01-01 RX ADMIN — BUDESONIDE AND FORMOTEROL FUMARATE DIHYDRATE 2 PUFF(S): 160; 4.5 AEROSOL RESPIRATORY (INHALATION) at 22:04

## 2018-01-01 RX ADMIN — GABAPENTIN 800 MILLIGRAM(S): 400 CAPSULE ORAL at 22:04

## 2018-01-01 RX ADMIN — GABAPENTIN 800 MILLIGRAM(S): 400 CAPSULE ORAL at 09:06

## 2018-01-01 RX ADMIN — POLYETHYLENE GLYCOL 3350 17 GRAM(S): 17 POWDER, FOR SOLUTION ORAL at 16:04

## 2018-01-01 RX ADMIN — Medication 1.5 MILLIGRAM(S): at 09:06

## 2018-01-02 RX ORDER — GABAPENTIN 400 MG/1
1200 CAPSULE ORAL AT BEDTIME
Qty: 0 | Refills: 0 | Status: DISCONTINUED | OUTPATIENT
Start: 2018-01-02 | End: 2018-01-02

## 2018-01-02 RX ORDER — GABAPENTIN 400 MG/1
1200 CAPSULE ORAL AT BEDTIME
Qty: 0 | Refills: 0 | Status: DISCONTINUED | OUTPATIENT
Start: 2018-01-03 | End: 2018-01-10

## 2018-01-02 RX ORDER — GABAPENTIN 400 MG/1
800 CAPSULE ORAL AT BEDTIME
Qty: 0 | Refills: 0 | Status: COMPLETED | OUTPATIENT
Start: 2018-01-02 | End: 2018-01-02

## 2018-01-02 RX ORDER — GABAPENTIN 400 MG/1
400 CAPSULE ORAL DAILY
Qty: 0 | Refills: 0 | Status: DISCONTINUED | OUTPATIENT
Start: 2018-01-03 | End: 2018-01-10

## 2018-01-02 RX ORDER — GABAPENTIN 400 MG/1
400 CAPSULE ORAL DAILY
Qty: 0 | Refills: 0 | Status: DISCONTINUED | OUTPATIENT
Start: 2018-01-02 | End: 2018-01-02

## 2018-01-02 RX ADMIN — Medication 600 MILLIGRAM(S): at 17:54

## 2018-01-02 RX ADMIN — Medication 600 MILLIGRAM(S): at 09:00

## 2018-01-02 RX ADMIN — Medication 1.5 MILLIGRAM(S): at 08:30

## 2018-01-02 RX ADMIN — Medication 600 MILLIGRAM(S): at 10:00

## 2018-01-02 RX ADMIN — GABAPENTIN 800 MILLIGRAM(S): 400 CAPSULE ORAL at 21:26

## 2018-01-02 RX ADMIN — FLUPHENAZINE HYDROCHLORIDE 5 MILLIGRAM(S): 1 TABLET, FILM COATED ORAL at 21:26

## 2018-01-02 RX ADMIN — GABAPENTIN 800 MILLIGRAM(S): 400 CAPSULE ORAL at 08:30

## 2018-01-02 RX ADMIN — BUDESONIDE AND FORMOTEROL FUMARATE DIHYDRATE 2 PUFF(S): 160; 4.5 AEROSOL RESPIRATORY (INHALATION) at 08:30

## 2018-01-02 RX ADMIN — LIDOCAINE 1 PATCH: 4 CREAM TOPICAL at 09:00

## 2018-01-02 RX ADMIN — Medication 1 EACH: at 17:54

## 2018-01-02 RX ADMIN — POLYETHYLENE GLYCOL 3350 17 GRAM(S): 17 POWDER, FOR SOLUTION ORAL at 13:33

## 2018-01-03 LAB
ALBUMIN SERPL ELPH-MCNC: 4.4 G/DL — SIGNIFICANT CHANGE UP (ref 3.3–5)
ALP SERPL-CCNC: 81 U/L — SIGNIFICANT CHANGE UP (ref 40–120)
ALT FLD-CCNC: 20 U/L — SIGNIFICANT CHANGE UP (ref 4–41)
AST SERPL-CCNC: 20 U/L — SIGNIFICANT CHANGE UP (ref 4–40)
BASOPHILS # BLD AUTO: 0.03 K/UL — SIGNIFICANT CHANGE UP (ref 0–0.2)
BASOPHILS NFR BLD AUTO: 0.2 % — SIGNIFICANT CHANGE UP (ref 0–2)
BILIRUB SERPL-MCNC: 0.3 MG/DL — SIGNIFICANT CHANGE UP (ref 0.2–1.2)
BUN SERPL-MCNC: 20 MG/DL — SIGNIFICANT CHANGE UP (ref 7–23)
CALCIUM SERPL-MCNC: 9.7 MG/DL — SIGNIFICANT CHANGE UP (ref 8.4–10.5)
CHLORIDE SERPL-SCNC: 99 MMOL/L — SIGNIFICANT CHANGE UP (ref 98–107)
CO2 SERPL-SCNC: 24 MMOL/L — SIGNIFICANT CHANGE UP (ref 22–31)
CREAT SERPL-MCNC: 0.99 MG/DL — SIGNIFICANT CHANGE UP (ref 0.5–1.3)
EOSINOPHIL # BLD AUTO: 0.04 K/UL — SIGNIFICANT CHANGE UP (ref 0–0.5)
EOSINOPHIL NFR BLD AUTO: 0.3 % — SIGNIFICANT CHANGE UP (ref 0–6)
GLUCOSE SERPL-MCNC: 94 MG/DL — SIGNIFICANT CHANGE UP (ref 70–99)
HCT VFR BLD CALC: 52.3 % — HIGH (ref 39–50)
HGB BLD-MCNC: 17.5 G/DL — HIGH (ref 13–17)
IMM GRANULOCYTES # BLD AUTO: 0.06 # — SIGNIFICANT CHANGE UP
IMM GRANULOCYTES NFR BLD AUTO: 0.4 % — SIGNIFICANT CHANGE UP (ref 0–1.5)
LYMPHOCYTES # BLD AUTO: 0.54 K/UL — LOW (ref 1–3.3)
LYMPHOCYTES # BLD AUTO: 3.8 % — LOW (ref 13–44)
MCHC RBC-ENTMCNC: 30.1 PG — SIGNIFICANT CHANGE UP (ref 27–34)
MCHC RBC-ENTMCNC: 33.5 % — SIGNIFICANT CHANGE UP (ref 32–36)
MCV RBC AUTO: 90 FL — SIGNIFICANT CHANGE UP (ref 80–100)
MONOCYTES # BLD AUTO: 0.48 K/UL — SIGNIFICANT CHANGE UP (ref 0–0.9)
MONOCYTES NFR BLD AUTO: 3.3 % — SIGNIFICANT CHANGE UP (ref 2–14)
NEUTROPHILS # BLD AUTO: 13.19 K/UL — HIGH (ref 1.8–7.4)
NEUTROPHILS NFR BLD AUTO: 92 % — HIGH (ref 43–77)
NRBC # FLD: 0 — SIGNIFICANT CHANGE UP
PLATELET # BLD AUTO: 238 K/UL — SIGNIFICANT CHANGE UP (ref 150–400)
PMV BLD: 10 FL — SIGNIFICANT CHANGE UP (ref 7–13)
POTASSIUM SERPL-MCNC: 4 MMOL/L — SIGNIFICANT CHANGE UP (ref 3.5–5.3)
POTASSIUM SERPL-SCNC: 4 MMOL/L — SIGNIFICANT CHANGE UP (ref 3.5–5.3)
PROT SERPL-MCNC: 7.8 G/DL — SIGNIFICANT CHANGE UP (ref 6–8.3)
RBC # BLD: 5.81 M/UL — HIGH (ref 4.2–5.8)
RBC # FLD: 12.1 % — SIGNIFICANT CHANGE UP (ref 10.3–14.5)
SODIUM SERPL-SCNC: 139 MMOL/L — SIGNIFICANT CHANGE UP (ref 135–145)
WBC # BLD: 14.34 K/UL — HIGH (ref 3.8–10.5)
WBC # FLD AUTO: 14.34 K/UL — HIGH (ref 3.8–10.5)

## 2018-01-03 PROCEDURE — 90870 ELECTROCONVULSIVE THERAPY: CPT

## 2018-01-03 RX ORDER — ONDANSETRON 8 MG/1
4 TABLET, FILM COATED ORAL ONCE
Qty: 0 | Refills: 0 | Status: COMPLETED | OUTPATIENT
Start: 2018-01-03 | End: 2018-01-03

## 2018-01-03 RX ORDER — ARIPIPRAZOLE 15 MG/1
5 TABLET ORAL AT BEDTIME
Qty: 0 | Refills: 0 | Status: DISCONTINUED | OUTPATIENT
Start: 2018-01-03 | End: 2018-01-10

## 2018-01-03 RX ORDER — SIMETHICONE 80 MG/1
80 TABLET, CHEWABLE ORAL EVERY 6 HOURS
Qty: 0 | Refills: 0 | Status: DISCONTINUED | OUTPATIENT
Start: 2018-01-03 | End: 2018-01-10

## 2018-01-03 RX ADMIN — GABAPENTIN 400 MILLIGRAM(S): 400 CAPSULE ORAL at 08:12

## 2018-01-03 RX ADMIN — Medication 30 MILLILITER(S): at 04:30

## 2018-01-03 RX ADMIN — ONDANSETRON 4 MILLIGRAM(S): 8 TABLET, FILM COATED ORAL at 08:12

## 2018-01-03 RX ADMIN — GABAPENTIN 1200 MILLIGRAM(S): 400 CAPSULE ORAL at 21:10

## 2018-01-03 RX ADMIN — Medication 650 MILLIGRAM(S): at 18:02

## 2018-01-03 RX ADMIN — Medication 600 MILLIGRAM(S): at 18:24

## 2018-01-03 RX ADMIN — ONDANSETRON 4 MILLIGRAM(S): 8 TABLET, FILM COATED ORAL at 05:35

## 2018-01-03 RX ADMIN — ARIPIPRAZOLE 5 MILLIGRAM(S): 15 TABLET ORAL at 21:10

## 2018-01-03 RX ADMIN — SIMETHICONE 80 MILLIGRAM(S): 80 TABLET, CHEWABLE ORAL at 11:22

## 2018-01-03 RX ADMIN — BUDESONIDE AND FORMOTEROL FUMARATE DIHYDRATE 2 PUFF(S): 160; 4.5 AEROSOL RESPIRATORY (INHALATION) at 08:12

## 2018-01-03 RX ADMIN — Medication 1.5 MILLIGRAM(S): at 08:12

## 2018-01-04 PROCEDURE — 99232 SBSQ HOSP IP/OBS MODERATE 35: CPT

## 2018-01-04 RX ADMIN — GABAPENTIN 1200 MILLIGRAM(S): 400 CAPSULE ORAL at 21:45

## 2018-01-04 RX ADMIN — BUDESONIDE AND FORMOTEROL FUMARATE DIHYDRATE 2 PUFF(S): 160; 4.5 AEROSOL RESPIRATORY (INHALATION) at 21:46

## 2018-01-04 RX ADMIN — BUDESONIDE AND FORMOTEROL FUMARATE DIHYDRATE 2 PUFF(S): 160; 4.5 AEROSOL RESPIRATORY (INHALATION) at 08:48

## 2018-01-04 RX ADMIN — GABAPENTIN 400 MILLIGRAM(S): 400 CAPSULE ORAL at 08:48

## 2018-01-04 RX ADMIN — Medication 650 MILLIGRAM(S): at 18:05

## 2018-01-04 RX ADMIN — ARIPIPRAZOLE 5 MILLIGRAM(S): 15 TABLET ORAL at 21:45

## 2018-01-04 RX ADMIN — Medication 1.5 MILLIGRAM(S): at 08:48

## 2018-01-05 PROCEDURE — 99232 SBSQ HOSP IP/OBS MODERATE 35: CPT | Mod: GC

## 2018-01-05 RX ADMIN — GABAPENTIN 1200 MILLIGRAM(S): 400 CAPSULE ORAL at 22:47

## 2018-01-05 RX ADMIN — Medication 1.5 MILLIGRAM(S): at 08:39

## 2018-01-05 RX ADMIN — ARIPIPRAZOLE 5 MILLIGRAM(S): 15 TABLET ORAL at 22:47

## 2018-01-05 RX ADMIN — BUDESONIDE AND FORMOTEROL FUMARATE DIHYDRATE 2 PUFF(S): 160; 4.5 AEROSOL RESPIRATORY (INHALATION) at 08:39

## 2018-01-06 RX ADMIN — GABAPENTIN 1200 MILLIGRAM(S): 400 CAPSULE ORAL at 21:59

## 2018-01-06 RX ADMIN — BUDESONIDE AND FORMOTEROL FUMARATE DIHYDRATE 2 PUFF(S): 160; 4.5 AEROSOL RESPIRATORY (INHALATION) at 10:38

## 2018-01-06 RX ADMIN — GABAPENTIN 400 MILLIGRAM(S): 400 CAPSULE ORAL at 10:38

## 2018-01-06 RX ADMIN — ARIPIPRAZOLE 5 MILLIGRAM(S): 15 TABLET ORAL at 21:59

## 2018-01-06 RX ADMIN — BUDESONIDE AND FORMOTEROL FUMARATE DIHYDRATE 2 PUFF(S): 160; 4.5 AEROSOL RESPIRATORY (INHALATION) at 22:00

## 2018-01-06 RX ADMIN — Medication 1.5 MILLIGRAM(S): at 10:38

## 2018-01-07 RX ADMIN — ARIPIPRAZOLE 5 MILLIGRAM(S): 15 TABLET ORAL at 21:40

## 2018-01-07 RX ADMIN — GABAPENTIN 400 MILLIGRAM(S): 400 CAPSULE ORAL at 09:28

## 2018-01-07 RX ADMIN — BUDESONIDE AND FORMOTEROL FUMARATE DIHYDRATE 2 PUFF(S): 160; 4.5 AEROSOL RESPIRATORY (INHALATION) at 09:59

## 2018-01-07 RX ADMIN — Medication 1.5 MILLIGRAM(S): at 09:28

## 2018-01-07 RX ADMIN — GABAPENTIN 1200 MILLIGRAM(S): 400 CAPSULE ORAL at 21:40

## 2018-01-08 PROCEDURE — 99232 SBSQ HOSP IP/OBS MODERATE 35: CPT | Mod: 25,GC

## 2018-01-08 PROCEDURE — 90870 ELECTROCONVULSIVE THERAPY: CPT

## 2018-01-08 RX ORDER — BENZTROPINE MESYLATE 1 MG
3 TABLET ORAL
Qty: 42 | Refills: 0 | OUTPATIENT
Start: 2018-01-08 | End: 2018-01-21

## 2018-01-08 RX ORDER — BENZOCAINE AND MENTHOL 5; 1 G/100ML; G/100ML
1 LIQUID ORAL
Qty: 0 | Refills: 0 | Status: DISCONTINUED | OUTPATIENT
Start: 2018-01-08 | End: 2018-01-10

## 2018-01-08 RX ORDER — GABAPENTIN 400 MG/1
1 CAPSULE ORAL
Qty: 56 | Refills: 0 | OUTPATIENT
Start: 2018-01-08 | End: 2018-01-21

## 2018-01-08 RX ORDER — ARIPIPRAZOLE 15 MG/1
1 TABLET ORAL
Qty: 14 | Refills: 0 | OUTPATIENT
Start: 2018-01-08 | End: 2018-01-21

## 2018-01-08 RX ADMIN — Medication 650 MILLIGRAM(S): at 19:53

## 2018-01-08 RX ADMIN — BUDESONIDE AND FORMOTEROL FUMARATE DIHYDRATE 2 PUFF(S): 160; 4.5 AEROSOL RESPIRATORY (INHALATION) at 09:16

## 2018-01-08 RX ADMIN — Medication 1.5 MILLIGRAM(S): at 09:16

## 2018-01-08 RX ADMIN — ARIPIPRAZOLE 5 MILLIGRAM(S): 15 TABLET ORAL at 22:07

## 2018-01-08 RX ADMIN — BENZOCAINE AND MENTHOL 1 LOZENGE: 5; 1 LIQUID ORAL at 20:53

## 2018-01-08 RX ADMIN — GABAPENTIN 1200 MILLIGRAM(S): 400 CAPSULE ORAL at 22:07

## 2018-01-08 RX ADMIN — BUDESONIDE AND FORMOTEROL FUMARATE DIHYDRATE 2 PUFF(S): 160; 4.5 AEROSOL RESPIRATORY (INHALATION) at 21:16

## 2018-01-09 PROCEDURE — 99232 SBSQ HOSP IP/OBS MODERATE 35: CPT | Mod: GC

## 2018-01-09 RX ADMIN — Medication 1.5 MILLIGRAM(S): at 08:41

## 2018-01-09 RX ADMIN — GABAPENTIN 1200 MILLIGRAM(S): 400 CAPSULE ORAL at 20:59

## 2018-01-09 RX ADMIN — BUDESONIDE AND FORMOTEROL FUMARATE DIHYDRATE 2 PUFF(S): 160; 4.5 AEROSOL RESPIRATORY (INHALATION) at 08:41

## 2018-01-09 RX ADMIN — ARIPIPRAZOLE 5 MILLIGRAM(S): 15 TABLET ORAL at 20:59

## 2018-01-09 RX ADMIN — BUDESONIDE AND FORMOTEROL FUMARATE DIHYDRATE 2 PUFF(S): 160; 4.5 AEROSOL RESPIRATORY (INHALATION) at 20:59

## 2018-01-09 RX ADMIN — GABAPENTIN 400 MILLIGRAM(S): 400 CAPSULE ORAL at 08:41

## 2018-01-10 VITALS — DIASTOLIC BLOOD PRESSURE: 77 MMHG | HEART RATE: 84 BPM | SYSTOLIC BLOOD PRESSURE: 117 MMHG | TEMPERATURE: 98 F

## 2018-01-10 PROCEDURE — 99232 SBSQ HOSP IP/OBS MODERATE 35: CPT | Mod: 25,GC

## 2018-01-10 PROCEDURE — 90870 ELECTROCONVULSIVE THERAPY: CPT

## 2018-01-10 RX ORDER — ARIPIPRAZOLE 15 MG/1
300 TABLET ORAL ONCE
Qty: 0 | Refills: 0 | Status: COMPLETED | OUTPATIENT
Start: 2018-01-10 | End: 2018-01-10

## 2018-01-10 RX ORDER — ARIPIPRAZOLE 15 MG/1
300 TABLET ORAL
Qty: 0 | Refills: 0 | COMMUNITY
Start: 2018-01-10

## 2018-01-10 RX ADMIN — ARIPIPRAZOLE 300 MILLIGRAM(S): 15 TABLET ORAL at 16:00

## 2018-01-10 RX ADMIN — Medication 650 MILLIGRAM(S): at 11:53

## 2018-01-11 ENCOUNTER — OUTPATIENT (OUTPATIENT)
Dept: OUTPATIENT SERVICES | Facility: HOSPITAL | Age: 28
LOS: 1 days | Discharge: HOME | End: 2018-01-11
Payer: MEDICAID

## 2018-01-11 DIAGNOSIS — F33.9 MAJOR DEPRESSIVE DISORDER, RECURRENT, UNSPECIFIED: ICD-10-CM

## 2018-01-12 DIAGNOSIS — F12.20 CANNABIS DEPENDENCE, UNCOMPLICATED: ICD-10-CM

## 2018-01-16 PROCEDURE — 90870 ELECTROCONVULSIVE THERAPY: CPT

## 2018-01-18 PROCEDURE — 90870 ELECTROCONVULSIVE THERAPY: CPT

## 2018-01-23 PROCEDURE — 90870 ELECTROCONVULSIVE THERAPY: CPT

## 2018-01-26 PROCEDURE — 90870 ELECTROCONVULSIVE THERAPY: CPT

## 2018-01-30 PROCEDURE — 90870 ELECTROCONVULSIVE THERAPY: CPT

## 2018-02-06 PROCEDURE — 90870 ELECTROCONVULSIVE THERAPY: CPT

## 2018-02-12 PROCEDURE — 90870 ELECTROCONVULSIVE THERAPY: CPT

## 2018-02-21 PROCEDURE — 90870 ELECTROCONVULSIVE THERAPY: CPT

## 2018-02-27 PROCEDURE — 90870 ELECTROCONVULSIVE THERAPY: CPT

## 2018-03-13 PROCEDURE — 90870 ELECTROCONVULSIVE THERAPY: CPT

## 2018-03-28 PROCEDURE — 90870 ELECTROCONVULSIVE THERAPY: CPT

## 2018-04-24 PROCEDURE — 90870 ELECTROCONVULSIVE THERAPY: CPT

## 2018-05-22 PROCEDURE — 90870 ELECTROCONVULSIVE THERAPY: CPT

## 2018-06-01 PROCEDURE — G9005: CPT

## 2018-06-19 PROCEDURE — 90870 ELECTROCONVULSIVE THERAPY: CPT

## 2018-07-01 ENCOUNTER — OUTPATIENT (OUTPATIENT)
Dept: OUTPATIENT SERVICES | Facility: HOSPITAL | Age: 28
LOS: 1 days | End: 2018-07-01
Payer: MEDICAID

## 2018-07-24 DIAGNOSIS — Z71.89 OTHER SPECIFIED COUNSELING: ICD-10-CM

## 2018-08-14 PROCEDURE — 90870 ELECTROCONVULSIVE THERAPY: CPT

## 2018-09-11 PROCEDURE — 90870 ELECTROCONVULSIVE THERAPY: CPT

## 2018-09-11 RX ORDER — MIDAZOLAM HYDROCHLORIDE 1 MG/ML
2 INJECTION, SOLUTION INTRAMUSCULAR; INTRAVENOUS ONCE
Qty: 0 | Refills: 0 | Status: DISCONTINUED | OUTPATIENT
Start: 2018-09-11 | End: 2018-09-11

## 2018-09-11 RX ADMIN — MIDAZOLAM HYDROCHLORIDE 2 MILLIGRAM(S): 1 INJECTION, SOLUTION INTRAMUSCULAR; INTRAVENOUS at 09:24

## 2018-09-27 NOTE — ED PROVIDER NOTE - CRITERIA ADMIT PEDS PT
No
Breathing spontaneous and unlabored. Breath sounds clear and equal bilaterally with regular rhythm.

## 2018-10-09 PROCEDURE — 90870 ELECTROCONVULSIVE THERAPY: CPT

## 2018-11-06 PROCEDURE — 90870 ELECTROCONVULSIVE THERAPY: CPT

## 2018-11-30 NOTE — ED ADULT NURSE NOTE - TEMPERATURE IN CELSIUS (DEGREES C)
Billing Type: Third-Party Bill Performing Laboratory: [id number] Bill For Surgical Tray: no Expected Date Of Service: 11/30/2018 37.1

## 2018-12-04 PROCEDURE — 90870 ELECTROCONVULSIVE THERAPY: CPT

## 2018-12-31 PROCEDURE — 90870 ELECTROCONVULSIVE THERAPY: CPT

## 2019-01-01 PROCEDURE — G9005: CPT

## 2019-03-01 ENCOUNTER — OUTPATIENT (OUTPATIENT)
Dept: OUTPATIENT SERVICES | Facility: HOSPITAL | Age: 29
LOS: 1 days | End: 2019-03-01
Payer: MEDICAID

## 2019-03-01 PROCEDURE — G9001: CPT

## 2019-03-18 DIAGNOSIS — Z71.89 OTHER SPECIFIED COUNSELING: ICD-10-CM

## 2020-01-01 NOTE — ED PROVIDER NOTE - ATTESTATION, MLM
unable to assess
I have reviewed and confirmed nurses' notes for patient's medications, allergies, medical history, and surgical history.

## 2020-01-11 NOTE — ED PROVIDER NOTE - NS ED MD EM SELECTION
Education Record    Learner:  Patient    Disease / Diagnosis: rectal cancer     Barriers / Limitations:  None   Comments:    Method:  Discussion   Comments:    General Topics:  Medication, Side effects and symptom management and Plan of care reviewed   Com
[id number] Comprehensive

## 2021-03-01 ENCOUNTER — OUTPATIENT (OUTPATIENT)
Dept: OUTPATIENT SERVICES | Facility: HOSPITAL | Age: 31
LOS: 1 days | End: 2021-03-01
Payer: MEDICAID

## 2021-03-23 ENCOUNTER — APPOINTMENT (OUTPATIENT)
Dept: CARE COORDINATION | Facility: HOME HEALTH | Age: 31
End: 2021-03-23

## 2021-03-26 DIAGNOSIS — Z71.89 OTHER SPECIFIED COUNSELING: ICD-10-CM

## 2021-08-01 PROCEDURE — G9005: CPT

## 2022-09-18 NOTE — ED BEHAVIORAL HEALTH ASSESSMENT NOTE - SUMMARY
negative... Pt  is a  27 year old [place] man, single with no dependents, living with parents, former [employer] student, reports that he is currently employed as a , , with PPHx of bipolar I disorder with psychotic features, history of multiple inpatient psychiatric admissions, most recently at [facility] from 10/6-10/12/17, denies h/o suicide attempts, no h/o violence/arrests, history of cannabis and cocaine use, no reported history of alcohol dependence/DTs/complicated withdrawals, currently in treatment with Dr Lynch, last seen on 10/13/17 and described as presenting hypomanic and somewhat disorganized. Pt was bib his mom today for pt's complaints of acute worsening of leg pain and acute agitation and disorganized behavior which both subsided with prn voluntary Haldol and Ativan. On interview, pt was calm and cooperative. He does present with hypomanic symptoms and some thought disorganization, however he denies any SI/HI, plan, or intent. He is future oriented and is motivated for continued outpt treatment. Pt denies any AVH/PI/IOR. He admits that his cannabis use was not good for him last night and states he would not do it anymore. At this time the pt does not present as an imminent safety risk to self or others. He will be discharged home with recommendation to keep his appointment with Dr. Lynch on 10/20/17.

## 2023-06-07 NOTE — ED PROVIDER NOTE - PSYCHIATRIC LEVEL OF CONSCIOUSNESS
follows commands/alert Propranolol Counseling:  I discussed with the patient the risks of propranolol including but not limited to low heart rate, low blood pressure, low blood sugar, restlessness and increased cold sensitivity. They should call the office if they experience any of these side effects.

## 2024-01-17 NOTE — ED PROVIDER NOTE - MEDICAL DECISION MAKING DETAILS
(E4) spontaneous 27y Male hx of schizophrenia, OCD, b/p, polysubstance abuse brought to ed for agitation.  Pt is alert and oriented w/o e/o injuries on exam, neuro- CN II-XII grossly intact, equal strength x all 4 extremities-  Labs unrevealing-  Medically cleared for psych disposition.

## 2025-03-26 NOTE — ED BEHAVIORAL HEALTH ASSESSMENT NOTE - NS ED [facility] BILLING ATTENDING W NP SUPERVISION ATTESTATION
NOTIFICATION OF INPATIENT ADMISSION   AUTHORIZATION REQUEST   SERVICING FACILITY:   [facility]  Address: [address] [place] [address]  Tax ID: [id number]  NPI: [id number] ATTENDING PROVIDER:  Attending Name and NPI#: Mary Reid Md [[id number]]  Address: [address] [place] [address]  Phone: [phone]   ADMISSION INFORMATION:  Place of Service: Inpatient [facility]  Place of Service Code: 21  Inpatient Admission Date/Time: 3/25/25 12:10 PM  Discharge Date/Time: No discharge date for patient encounter.  Admitting Diagnosis Code/Description:  Lung nodule [R91.1]     UTILIZATION REVIEW CONTACT:  [facility] Utilization   Network Utilization Review Department  Phone: [phone]  Fax: [phone]  Email: [email or web address]  Contact for approvals/pending authorizations, clinical reviews, and discharge.     PHYSICIAN ADVISORY SERVICES:  Medical Necessity Denial & Rjta-eg-Vzye Review  Phone: [phone]  Fax: [phone]  Email: [email or web address]     DISCHARGE SUPPORT TEAM:  For Patients Discharge Needs & Updates  Phone: [phone] opt. 2 Fax: [phone]  Email: [email or web address]       Agree